# Patient Record
Sex: MALE | Race: OTHER | HISPANIC OR LATINO | Employment: OTHER | URBAN - METROPOLITAN AREA
[De-identification: names, ages, dates, MRNs, and addresses within clinical notes are randomized per-mention and may not be internally consistent; named-entity substitution may affect disease eponyms.]

---

## 2018-03-24 ENCOUNTER — CLINICAL SUPPORT (OUTPATIENT)
Dept: OCCUPATIONAL MEDICINE | Facility: CLINIC | Age: 38
End: 2018-03-24

## 2018-03-24 DIAGNOSIS — Z00.00 PHYSICAL EXAM: Primary | ICD-10-CM

## 2018-03-24 PROCEDURE — 99499 UNLISTED E&M SERVICE: CPT | Mod: S$GLB,,, | Performed by: PREVENTIVE MEDICINE

## 2018-03-24 PROCEDURE — 99002 DEVICE HANDLING PHYS/QHP: CPT | Mod: S$GLB,,, | Performed by: PREVENTIVE MEDICINE

## 2018-03-24 PROCEDURE — 99080 SPECIAL REPORTS OR FORMS: CPT | Mod: S$GLB,,, | Performed by: PREVENTIVE MEDICINE

## 2018-03-24 PROCEDURE — 94010 BREATHING CAPACITY TEST: CPT | Mod: S$GLB,,, | Performed by: PREVENTIVE MEDICINE

## 2019-01-27 ENCOUNTER — HOSPITAL ENCOUNTER (EMERGENCY)
Facility: HOSPITAL | Age: 39
Discharge: HOME | End: 2019-01-27
Attending: EMERGENCY MEDICINE
Payer: MEDICAID

## 2019-01-27 VITALS
RESPIRATION RATE: 20 BRPM | DIASTOLIC BLOOD PRESSURE: 81 MMHG | SYSTOLIC BLOOD PRESSURE: 133 MMHG | HEART RATE: 61 BPM | WEIGHT: 193 LBS | HEIGHT: 69 IN | BODY MASS INDEX: 28.58 KG/M2 | OXYGEN SATURATION: 99 % | TEMPERATURE: 98 F

## 2019-01-27 DIAGNOSIS — R21 GENERALIZED RASH: Primary | ICD-10-CM

## 2019-01-27 PROCEDURE — 99284 EMERGENCY DEPT VISIT MOD MDM: CPT

## 2019-01-27 PROCEDURE — 25000003 PHARM REV CODE 250: Performed by: PHYSICIAN ASSISTANT

## 2019-01-27 PROCEDURE — 63600175 PHARM REV CODE 636 W HCPCS: Performed by: PHYSICIAN ASSISTANT

## 2019-01-27 RX ORDER — HYDROXYZINE HYDROCHLORIDE 25 MG/1
25 TABLET, FILM COATED ORAL EVERY 6 HOURS
Qty: 20 TABLET | Refills: 0 | Status: SHIPPED | OUTPATIENT
Start: 2019-01-27

## 2019-01-27 RX ORDER — PREDNISONE 20 MG/1
40 TABLET ORAL DAILY
Qty: 10 TABLET | Refills: 0 | Status: SHIPPED | OUTPATIENT
Start: 2019-01-27 | End: 2019-02-01

## 2019-01-27 RX ORDER — PREDNISONE 20 MG/1
60 TABLET ORAL
Status: COMPLETED | OUTPATIENT
Start: 2019-01-27 | End: 2019-01-27

## 2019-01-27 RX ORDER — DIPHENHYDRAMINE HCL 25 MG
25 TABLET ORAL NIGHTLY PRN
COMMUNITY

## 2019-01-27 RX ORDER — DIPHENHYDRAMINE HCL 50 MG
50 CAPSULE ORAL
Status: COMPLETED | OUTPATIENT
Start: 2019-01-27 | End: 2019-01-27

## 2019-01-27 RX ORDER — TRIAMCINOLONE ACETONIDE 1 MG/ML
LOTION TOPICAL 2 TIMES DAILY
Qty: 60 ML | Refills: 0 | Status: SHIPPED | OUTPATIENT
Start: 2019-01-27

## 2019-01-27 RX ORDER — FAMOTIDINE 20 MG/1
20 TABLET, FILM COATED ORAL
Status: COMPLETED | OUTPATIENT
Start: 2019-01-27 | End: 2019-01-27

## 2019-01-27 RX ORDER — HYDROXYZINE PAMOATE 25 MG/1
25 CAPSULE ORAL 4 TIMES DAILY
COMMUNITY
End: 2019-01-27 | Stop reason: ALTCHOICE

## 2019-01-27 RX ORDER — PREDNISONE 5 MG/1
5 TABLET ORAL DAILY
COMMUNITY
End: 2019-01-27

## 2019-01-27 RX ADMIN — PREDNISONE 60 MG: 20 TABLET ORAL at 11:01

## 2019-01-27 RX ADMIN — FAMOTIDINE 20 MG: 20 TABLET ORAL at 11:01

## 2019-01-27 RX ADMIN — DIPHENHYDRAMINE HYDROCHLORIDE 50 MG: 50 CAPSULE ORAL at 11:01

## 2019-01-27 NOTE — ED TRIAGE NOTES
Pt. With girlfriend who states that pt. Started having a rash on his chest since 1/3/19. She reports that pt. Was seen by his PCP and dx with with Scabies and was treated. Pt. Used medication from his neck down. On the 1/5/19 pt. Started having a rash to his face. Pt. Has been taking benadryl, vistaril, and prednisone with no relief. Pt. Reports itching to his face, back, chest and bilat upper thighs. Girlfriend reports she spoke with PCP and was told that pt. Possible did not have scabies and just to cont taking medication. She states her nor her daughter has had any rash or itching.

## 2019-01-27 NOTE — ED NOTES
Pt. Left with girlfriend- pt. Reports she will be following pt home. Pt. Is able to verbally communicate and has a steady gait. Pt. States she feels fine. Girlfriend will follow pt. Home.

## 2022-10-28 ENCOUNTER — HOSPITAL ENCOUNTER (EMERGENCY)
Facility: HOSPITAL | Age: 42
Discharge: HOME | End: 2022-10-28
Attending: EMERGENCY MEDICINE
Payer: MEDICAID

## 2022-10-28 VITALS
WEIGHT: 190 LBS | RESPIRATION RATE: 17 BRPM | DIASTOLIC BLOOD PRESSURE: 88 MMHG | HEIGHT: 69 IN | SYSTOLIC BLOOD PRESSURE: 131 MMHG | OXYGEN SATURATION: 100 % | TEMPERATURE: 98 F | BODY MASS INDEX: 28.14 KG/M2 | HEART RATE: 78 BPM

## 2022-10-28 DIAGNOSIS — S93.401A SPRAIN OF RIGHT ANKLE, UNSPECIFIED LIGAMENT, INITIAL ENCOUNTER: ICD-10-CM

## 2022-10-28 DIAGNOSIS — M25.471 PAIN AND SWELLING OF RIGHT ANKLE: Primary | ICD-10-CM

## 2022-10-28 DIAGNOSIS — M25.571 PAIN AND SWELLING OF RIGHT ANKLE: Primary | ICD-10-CM

## 2022-10-28 PROCEDURE — 99284 EMERGENCY DEPT VISIT MOD MDM: CPT

## 2022-10-28 NOTE — DISCHARGE INSTRUCTIONS
§ Please return to the Emergency Department for any new or worsening symptoms including: fever, chest pain, shortness of breath, loss of consciousness, dizziness, weakness, or any other concerns.     § Schedule an appointment for follow up with Orthopedics as soon as possible for a recheck of your symptoms. If you do not have one, contact the one listed on your discharge paperwork or call the [facility] Appointment Desk at [phone] to schedule an appointment.     § If you require follow up care from a specialist and are unable to schedule an appointment with them directly, please contact your Primary Care Provider on the next business day to set up a referral.      § Crutches: You can use crutches to help with walking for the next 2 - 3 days.  Rest, Use Ice Pack, Compress (use the ACE Wrap), and Elevate to help with pain and swelling.     Call 1: Dr. Prasad   Call 2: Dr. Sims  Call 3: [place] (or another primary care) to [facility] and [facility].       § Regrese al Departamento de Emergencias si presenta síntomas nuevos o que empeoran, incluidos: fiebre, dolor de pecho, dificultad para respirar, pérdida del conocimiento, mareos, debilidad o cualquier otra inquietud.    § Programe marybeth herminio de seguimiento con [employer] lo antes posible para un nuevo control de yenni síntomas. Si no tiene katia, comuníquese con el que figura en srivastava documentación de yas o llame al mostrador de citas de la [facility] al [phone] para programar marybeth herminio.    § Si necesita atención de seguimiento de un especialista y no puede programar marybeth herminio con él directamente, comuníquese con srivastava proveedor de atención primaria el siguiente día hábil para programar marybeth derivación.    § Muletas: puede usar muletas para ayudarlo a caminar mary alice los próximos 2 a 3 días. Descanse, use bolsa de hielo, comprima (use la envoltura ACE) y eleve para ayudar con el dolor y la hinchazón.    Llamada 1:   Osmar Leahy 2: Dra. Levi pineda 3: [facility] (u otro centro de atención primaria) para establecer atención y [facility] del [facility].

## 2022-10-28 NOTE — ED PROVIDER NOTES
Encounter Date: 10/28/2022    SCRIBE #1 NOTE: I, Anthony Cohen, am scribing for, and in the presence of,  LOLA Nuno. Other sections scribed: HPI, ROS.     History     Chief Complaint   Patient presents with    Ankle Injury     Presents with a complaint of right ankle pain and swelling, states twisted ankle yesterday after stepping on a pipe while at work, seen at Urgent Care and provided Rx for anti-inflammatories and crutches, Patient presents with image CD provided by staff.     CC: Ankle injury    HPI: This is a 41 y.o. M who has no PMHx who presents to the ED for emergent evaluation of acute right ankle pain that began after stepping on a pipe and twisting his ankle at work yesterday. Pt was evaluated and treated at an Urgent Care in [facility] yesterday. He was prescribed an anti-inflammatory and told to  crutches at the pharmacy as well. He was not montserrat to fill medications or  the crutches due to the pharmacy being closed by the time he was discharged from the Urgent care facility. The pt also had a X-ray of the ankle that was normal at that visit. He was instructed to the report to the ED if ankle pain worsens. Pt has no other complaints.    There is no problem list on file for this patient.    The history is provided by the patient. The history is limited by a language barrier. A  was used ([employer]  940695 used for interpretation.).   Review of patient's allergies indicates:  No Known Allergies  History reviewed. No pertinent past medical history.  History reviewed. No pertinent surgical history.  History reviewed. No pertinent family history.  Social History     Tobacco Use    Smoking status: Every Day     Packs/day: 0.25     Types: Cigarettes    Smokeless tobacco: Never   Substance Use Topics    Alcohol use: No    Drug use: No     Review of Systems   Constitutional:  Negative for fever.   HENT:  Negative for sore throat.    Respiratory:  Negative for  cough and shortness of breath.    Cardiovascular:  Negative for chest pain.   Gastrointestinal:  Negative for abdominal pain, diarrhea, nausea and vomiting.   Genitourinary:  Negative for dysuria.   Musculoskeletal:  Positive for arthralgias and joint swelling. Negative for back pain.   Skin:  Negative for rash.   Neurological:  Negative for weakness.   Hematological:  Does not bruise/bleed easily.   Psychiatric/Behavioral:  Negative for confusion.      Physical Exam     Initial Vitals [10/28/22 0657]   BP Pulse Resp Temp SpO2   134/86 77 17 97.9 °F (36.6 °C) 100 %      MAP       --         Physical Exam    Nursing note and vitals reviewed.  Constitutional: He appears well-developed and well-nourished. He is not diaphoretic. He is cooperative.  Non-toxic appearance. He does not have a sickly appearance. He does not appear ill. No distress.   HENT:   Head: Normocephalic and atraumatic.   Right Ear: External ear normal.   Left Ear: External ear normal.   Nose: Nose normal.   Mouth/Throat: Mucous membranes are normal. No trismus in the jaw.   Neck: Phonation normal.   Normal range of motion.  Cardiovascular:  Normal rate, regular rhythm and intact distal pulses.           Pulses:       Dorsalis pedis pulses are 2+ on the right side.   Pulmonary/Chest: No respiratory distress.   Abdominal: He exhibits no distension.   Musculoskeletal:      Cervical back: Normal range of motion.      Right hip: No tenderness or bony tenderness. Normal range of motion.      Right upper leg: No tenderness or bony tenderness.      Right knee: No bony tenderness. No tenderness.      Right lower leg: No tenderness or bony tenderness.      Right ankle: Swelling present. Tenderness present over the lateral malleolus. No proximal fibula tenderness.      Right foot: Tenderness (proximal/lateral) present.     Neurological: He is alert and oriented to person, place, and time. No sensory deficit. Coordination normal. GCS eye subscore is 4. GCS  verbal subscore is 5. GCS motor subscore is 6.   Skin: Skin is warm, dry and intact. Capillary refill takes less than 2 seconds. No bruising, no laceration and no rash noted. No cyanosis or erythema.   Psychiatric: He has a normal mood and affect. His speech is normal and behavior is normal. Judgment and thought content normal.       ED Course   Procedures  Labs Reviewed - No data to display       Imaging Results              X-Ray Ankle Complete Right (Final result)  Result time 10/28/22 08:02:26      Final result by Zelalem Zelaya MD (10/28/22 08:02:26)                   Impression:      Negative for fracture.  Some soft tissue swelling about the ankle, particularly at the lateral malleolus.      Electronically signed by: Zelalem Zelaya MD  Date:    10/28/2022  Time:    08:02               Narrative:    EXAMINATION:  XR ANKLE COMPLETE 3 VIEW RIGHT    CLINICAL HISTORY:  Pain in right ankle and joints of right foot    TECHNIQUE:  AP, lateral, and oblique images of the right ankle were performed.    COMPARISON:  None    FINDINGS:  Bones are well mineralized.  The ankle mortise is intact.  No fracture or dislocation is seen.  Mild degenerative changes at the talonavicular joint.  Soft tissue swelling about the ankle, particularly about the lateral malleolus.                                       X-Ray Foot Complete Right (Final result)  Result time 10/28/22 08:06:21      Final result by Zelalem Zelaya MD (10/28/22 08:06:21)                   Impression:      No acute abnormality      Electronically signed by: Zelalem Zelaya MD  Date:    10/28/2022  Time:    08:06               Narrative:    EXAMINATION:  XR FOOT COMPLETE 3 VIEW RIGHT    CLINICAL HISTORY:  . Pain in right ankle and joints of right foot    TECHNIQUE:  AP, lateral, and oblique views of the right foot were performed.    COMPARISON:  None    FINDINGS:  Bones are well mineralized.  Alignment is satisfactory and joint spaces appear adequately maintained.  Slight  degenerative changes at the 1st MTP joint.  Mild degenerative changes at the talonavicular joint.  No definite evidence of acute fracture or dislocation.  A small, well corticated ossific density is seen adjacent to the distal tip of the fibula.  Exostosis at the base of the distal phalanx of the great toe.  No soft tissue abnormality appreciated.                                       Medications - No data to display        APC / Resident Notes:   This is an evaluation of a 41 y.o. male that presents to the Emergency Department for right ankle pain following injury. Seen at [facility] and diagnosed with sprain and xrays done. Unable to review CD images. Attempted to contact [facility] to discuss reason for sending patient to ED to ensure patient receives appropriate care. Awaiting callback as of 7:15a. Physical Exam shows a non-toxic, afebrile, and well appearing male.  Tenderness to palpation of the right lateral malleolus.  Some tenderness over the very proximal right lateral foot.  Compartments soft.  Vital signs are reassuring. If available, previous records reviewed. RESULTS:  X-ray of the foot and ankle without acute displaced fracture or dislocation.    My overall impression is right ankle pain and swelling, suspect sprain. I considered, but at this time, do not suspect acute displaced fracture, dislocation, septic joint, cellulitis, compartment syndrome.    Discharge Meds/Instructions:  Continue NSAIDs as prescribed by outside clinic, will give crutches as patient does not currently have crutches.  Ace wrap. The diagnosis, treatment plan, instructions for follow-up as well as ED return precautions were discussed. All questions have been answered. MEET Frederick, JAMIEP-C   Scribe Attestation:   Scribe #1: I performed the above scribed service and the documentation accurately describes the services I performed. I attest to the accuracy of the note.      ED Course as of 10/28/22 0903   Fri Oct 28, 2022   0715 Call out to [facility]  to speak with Dr. Mary Lala regarding why the patient was sent to the ED. Left message with the [facility] staff to have Dr. Lala return the call. Patient family states was told to come to the ED this morning to be seen by orthopedics. Family reports xray done and told there was not fracture but it was sprained. As I am unable to review images on the patients CD, will order xrays.  [AF]   0902 At the time of note completion, I still have not had a call back from Dr. Lala. With xrays being negative and no additional communication or paperwork from [facility] - will discharge the patient with PCP/Ortho f/u. RICE instructions.  [AF]      ED Course User Index  [AF] Toan aHll, LOLA KOLB, MEET Frederick, FNP-C, personally performed the services described in this documentation. All medical record entries made by the scribe were at my direction and in my presence. I have reviewed the chart and agree that the record reflects my personal performance and is accurate and complete.    Clinical Impression:   Final diagnoses:  [M25.571, M25.471] Pain and swelling of right ankle (Primary)  [S93.401A] Sprain of right ankle, unspecified ligament, initial encounter      ED Disposition Condition    Discharge Stable          ED Prescriptions    None       Follow-up Information       Follow up With Specialties Details Why Contact Info    Stephane Prasad MD Orthopedic Surgery Call in 1 day To discuss your ED visit & schedule follow-up [address]  [address]  [place] [address]  [phone]      Jose Ramon Sims MD Orthopedic Surgery Call in 1 day to schedule an appointment for followup with Orthopedics [address]  [address]  [place] [address]  [phone]      [facility] [place] [facility] [place]  Schedule an appointment as soon as possible for a visit  For Follow-Up, This Week, If you do not have a Primary Care Doctor [address] [place] [address]  [phone]      [facility] Surgical Oncology,  Orthopedic Surgery, Genetics, Physical Medicine and Rehabilitation, Occupational Therapy, Radiology Schedule an appointment as soon as possible for a visit   [address] [address]  [phone]               LOLA gN  10/28/22 0903

## 2024-05-07 ENCOUNTER — HOSPITAL ENCOUNTER (EMERGENCY)
Facility: HOSPITAL | Age: 44
Discharge: HOME | End: 2024-05-08
Attending: EMERGENCY MEDICINE
Payer: COMMERCIAL

## 2024-05-07 DIAGNOSIS — H20.9 IRITIS: ICD-10-CM

## 2024-05-07 DIAGNOSIS — H18.891 CORNEAL RUST RING OF RIGHT EYE: Primary | ICD-10-CM

## 2024-05-07 DIAGNOSIS — S05.01XA ABRASION OF RIGHT CORNEA, INITIAL ENCOUNTER: ICD-10-CM

## 2024-05-07 PROCEDURE — 65222 REMOVE FOREIGN BODY FROM EYE: CPT | Mod: RT

## 2024-05-07 PROCEDURE — 25000003 PHARM REV CODE 250: Performed by: NURSE PRACTITIONER

## 2024-05-07 PROCEDURE — 25000003 PHARM REV CODE 250: Performed by: PHYSICIAN ASSISTANT

## 2024-05-07 PROCEDURE — 99284 EMERGENCY DEPT VISIT MOD MDM: CPT | Mod: 25

## 2024-05-07 RX ORDER — OXYCODONE AND ACETAMINOPHEN 5; 325 MG/1; MG/1
1 TABLET ORAL
Status: COMPLETED | OUTPATIENT
Start: 2024-05-07 | End: 2024-05-08

## 2024-05-07 RX ORDER — PROPARACAINE HYDROCHLORIDE 5 MG/ML
1 SOLUTION/ DROPS OPHTHALMIC
Status: COMPLETED | OUTPATIENT
Start: 2024-05-07 | End: 2024-05-07

## 2024-05-07 RX ORDER — CYCLOPENTOLATE HYDROCHLORIDE 10 MG/ML
2 SOLUTION/ DROPS OPHTHALMIC EVERY 6 HOURS PRN
Qty: 15 ML | Refills: 0 | Status: SHIPPED | OUTPATIENT
Start: 2024-05-07

## 2024-05-07 RX ORDER — CYCLOPENTOLATE HYDROCHLORIDE 10 MG/ML
1 SOLUTION/ DROPS OPHTHALMIC
Status: COMPLETED | OUTPATIENT
Start: 2024-05-07 | End: 2024-05-08

## 2024-05-07 RX ORDER — ERYTHROMYCIN 5 MG/G
OINTMENT OPHTHALMIC 4 TIMES DAILY
Qty: 3.5 G | Refills: 0 | Status: SHIPPED | OUTPATIENT
Start: 2024-05-07 | End: 2024-05-14

## 2024-05-07 RX ADMIN — FLUORESCEIN SODIUM 1 EACH: 1 STRIP OPHTHALMIC at 11:05

## 2024-05-07 RX ADMIN — PROPARACAINE HYDROCHLORIDE 1 DROP: 5 SOLUTION/ DROPS OPHTHALMIC at 11:05

## 2024-05-08 ENCOUNTER — OFFICE VISIT (OUTPATIENT)
Dept: OPHTHALMOLOGY | Facility: CLINIC | Age: 44
End: 2024-05-08
Payer: COMMERCIAL

## 2024-05-08 VITALS
WEIGHT: 185 LBS | OXYGEN SATURATION: 99 % | DIASTOLIC BLOOD PRESSURE: 93 MMHG | TEMPERATURE: 98 F | SYSTOLIC BLOOD PRESSURE: 147 MMHG | RESPIRATION RATE: 18 BRPM | BODY MASS INDEX: 27.4 KG/M2 | HEIGHT: 69 IN | HEART RATE: 60 BPM

## 2024-05-08 VITALS
TEMPERATURE: 98 F | WEIGHT: 185 LBS | DIASTOLIC BLOOD PRESSURE: 82 MMHG | HEART RATE: 63 BPM | RESPIRATION RATE: 16 BRPM | BODY MASS INDEX: 27.4 KG/M2 | OXYGEN SATURATION: 99 % | HEIGHT: 69 IN | SYSTOLIC BLOOD PRESSURE: 123 MMHG

## 2024-05-08 DIAGNOSIS — H16.001 CORNEAL ULCER OF RIGHT EYE: Primary | ICD-10-CM

## 2024-05-08 DIAGNOSIS — T15.01XA RUST RING OF RIGHT CORNEA DUE TO METALLIC FOREIGN BODY: ICD-10-CM

## 2024-05-08 DIAGNOSIS — H57.8A9 SENSATION OF FOREIGN BODY IN EYE: Primary | ICD-10-CM

## 2024-05-08 LAB
GRAM STN SPEC: NORMAL
GRAM STN SPEC: NORMAL
KOH PREP SPEC: NORMAL

## 2024-05-08 PROCEDURE — 87210 SMEAR WET MOUNT SALINE/INK: CPT

## 2024-05-08 PROCEDURE — 99282 EMERGENCY DEPT VISIT SF MDM: CPT

## 2024-05-08 PROCEDURE — 92002 INTRM OPH EXAM NEW PATIENT: CPT | Mod: S$GLB,,, | Performed by: STUDENT IN AN ORGANIZED HEALTH CARE EDUCATION/TRAINING PROGRAM

## 2024-05-08 PROCEDURE — 87070 CULTURE OTHR SPECIMN AEROBIC: CPT

## 2024-05-08 PROCEDURE — 87102 FUNGUS ISOLATION CULTURE: CPT

## 2024-05-08 PROCEDURE — 25000003 PHARM REV CODE 250

## 2024-05-08 PROCEDURE — 87205 SMEAR GRAM STAIN: CPT

## 2024-05-08 PROCEDURE — 87075 CULTR BACTERIA EXCEPT BLOOD: CPT

## 2024-05-08 PROCEDURE — 99999 PR PBB SHADOW E&M-EST. PATIENT-LVL III: CPT | Mod: PBBFAC,,, | Performed by: STUDENT IN AN ORGANIZED HEALTH CARE EDUCATION/TRAINING PROGRAM

## 2024-05-08 RX ADMIN — OXYCODONE HYDROCHLORIDE AND ACETAMINOPHEN 1 TABLET: 5; 325 TABLET ORAL at 12:05

## 2024-05-08 RX ADMIN — CYCLOPENTOLATE HYDROCHLORIDE 1 DROP: 10 SOLUTION/ DROPS OPHTHALMIC at 12:05

## 2024-05-08 NOTE — ED TRIAGE NOTES
Patient reports metal in eye yesterday. Evaluated per urgent care and [facility] and was unable to see foreign object. Instructed to come to ER for optho eval today.

## 2024-05-08 NOTE — PROGRESS NOTES
Subjective:       Patient ID: Harman Osuna is a 43 y.o. male.    Chief Complaint: Follow-up    HPI    43 y.o. male presents for ED Follow up. Was seen at ED today for foreign   body OD. Patient is a , was working while wearing eye protection and   states something flew into his right eye. States ophthalmologist said he   did not have anything in his eye anymore but it left behind a rust ring   but did not feel comfortable removing it due to damage it may cause.   Patient was unable to start eye drops due to them being out of stock.   Complains of light sensitivity and blurred vision OD, pain, and tearing.   Denies discharge, headaches, flashes, and floaters.   Last edited by Sasha Colin on 5/8/2024  1:44 PM.               Assessment & Plan   Corneal ulcer of right eye  -     CULTURE, AEROBIC  (SPECIFY SOURCE)  -     CULTURE, ANAEROBE  -     Gram stain  -     Fungus culture  -     KOH PREP         PLAN  Cultures taken at slit lamp  Moxi 1 gtt x3 q5 minute for loading (given in clinic)  Then Moxi q1H around the clock  Work not provided       RTC 1 day with cornea clinic    Rola Caceres M.D., M.S.  Department of Ophthalmology   Division of Glaucoma Surgery  [facility]

## 2024-05-08 NOTE — DISCHARGE INSTRUCTIONS
Go directly to your appointment with the eye doctor at 1pm today.  10th floor [facility]  Return to ED for any concerns

## 2024-05-08 NOTE — ED PROVIDER NOTES
Encounter Date: 5/7/2024    SCRIBE #1 NOTE: I, Reebca Tasia, am scribing for, and in the presence of,  José Luis Hankins PA-C.       History     Chief Complaint   Patient presents with    Foreign Body in Eye     Reports metal object in eye today, reports going to urgent care and being told to go to ER due to objecting in cornea     44 yo Lao speaking M w/ no PMHx presenting to the ED for pain and FB dislodged in R eye. 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 He had protective glasses on. He went to  and they were unable to remove the FB dislodged in his R eye and was told to come here for evaluation. No other exacerbating or alleviating factors. Denies vision changes, or other associated symptoms.     The history is provided by the patient.     Review of patient's allergies indicates:  No Known Allergies  History reviewed. No pertinent past medical history.  History reviewed. No pertinent surgical history.  No family history on file.  Social History     Tobacco Use    Smoking status: Every Day     Current packs/day: 0.25     Types: Cigarettes    Smokeless tobacco: Never   Substance Use Topics    Alcohol use: No    Drug use: No     Review of Systems   Constitutional:  Negative for chills, diaphoresis, fatigue and fever.   HENT:  Negative for congestion, ear discharge, ear pain, rhinorrhea, sore throat and trouble swallowing.    Eyes:  Positive for pain. Negative for photophobia, redness and visual disturbance.        +FB in R eye    Respiratory:  Negative for cough and shortness of breath.    Cardiovascular:  Negative for chest pain, palpitations and leg swelling.   Gastrointestinal:  Negative for abdominal pain, diarrhea, nausea and vomiting.   Genitourinary:  Negative for difficulty urinating, dysuria, flank pain, frequency and hematuria.   Musculoskeletal:  Negative for arthralgias, back pain, myalgias, neck pain and neck stiffness.   Skin:  Negative for  pallor and rash.   Neurological:  Negative for dizziness, weakness, light-headedness, numbness and headaches.   Hematological:  Does not bruise/bleed easily.       Physical Exam     Initial Vitals [05/07/24 2016]   BP Pulse Resp Temp SpO2   (!) 144/90 70 18 97.8 °F (36.6 °C) 99 %      MAP       --         Physical Exam    Nursing note and vitals reviewed.  Constitutional: He appears well-developed and well-nourished. He is not diaphoretic. He does not appear ill. No distress.   HENT:   Head: Normocephalic and atraumatic.   Right Ear: External ear normal.   Left Ear: External ear normal.   Nose: Nose normal.   Mouth/Throat: Uvula is midline and oropharynx is clear and moist.   Eyes: EOM and lids are normal. Pupils are equal, round, and reactive to light. Right eye exhibits no chemosis, no discharge, no exudate and no hordeolum. No foreign body present in the right eye. Left eye exhibits no chemosis, no discharge, no exudate and no hordeolum. No foreign body present in the left eye. Right conjunctiva is injected. Right conjunctiva has no hemorrhage. Left conjunctiva is not injected. Left conjunctiva has no hemorrhage. No scleral icterus.   Slit lamp exam:       The right eye shows corneal abrasion and fluorescein uptake. The right eye shows no corneal flare, no corneal ulcer, no foreign body, no hyphema and no hypopyon.   No rita sign. Rust ring at 6 o'clock of cornea   Neck: Trachea normal. Neck supple.   Normal range of motion.   Full passive range of motion without pain.     Cardiovascular:  Normal rate, regular rhythm, normal heart sounds, intact distal pulses and normal pulses.     Exam reveals no distant heart sounds and no friction rub.       Pulmonary/Chest: Effort normal and breath sounds normal. No respiratory distress.   Abdominal: Abdomen is soft. Bowel sounds are normal. He exhibits no distension and no pulsatile midline mass. There is no abdominal tenderness.   No right CVA tenderness.  No left CVA  tenderness. There is no rebound and no guarding.   Musculoskeletal:         General: Normal range of motion.      Right shoulder: Normal.      Left shoulder: Normal.      Right elbow: Normal.      Left elbow: Normal.      Right wrist: Normal.      Left wrist: Normal.      Right hand: Normal.      Left hand: Normal.      Cervical back: Normal, full passive range of motion without pain, normal range of motion and neck supple.      Thoracic back: Normal.      Lumbar back: Normal.      Right hip: Normal.      Left hip: Normal.      Right knee: Normal.      Left knee: Normal.      Right lower leg: Normal.      Left lower leg: Normal.      Right ankle: Normal.      Left ankle: Normal.      Right foot: Normal.      Left foot: Normal.     Neurological: He is alert and oriented to person, place, and time. He has normal strength. No cranial nerve deficit or sensory deficit. Coordination and gait normal.   Skin: Skin is warm and dry. Capillary refill takes less than 2 seconds. No bruising, no ecchymosis and no rash noted. No erythema.   Psychiatric: He has a normal mood and affect. His speech is normal and behavior is normal. Thought content normal.         ED Course   Foreign Body    Date/Time: 5/7/2024 11:52 PM    Performed by: Francesco Watson PA-C  Authorized by: Blade Hernandez MD  Body area: eye  Location details: right cornea  Anesthesia: local infiltration    Anesthesia:  Local Anesthetic: proparacaine drops    Patient sedated: no  Patient restrained: no  Patient cooperative: yes  Localization method: slit lamp and visualized  Removal mechanism: moist cotton swab and ophthalmic le  Eye examined with fluorescein.  Fluorescein uptake.  Corneal abrasion size: medium  Corneal abrasion location: inferior  Residual rust ring present.  Residual rust ring removed: only partial removal.  Dressing: antibiotic drops  Depth: embedded  Complexity: complex  Patient tolerance: Patient tolerated the procedure well with no  immediate complications  Comments: No FB: just rust ring. Only partial removal of rust ring. Edge of cornea and sclera, 6 o'clock position.      Labs Reviewed - No data to display       Imaging Results    None          Medications   cyclopentolate 1% ophthalmic solution 1 drop (has no administration in time range)   oxyCODONE-acetaminophen 5-325 mg per tablet 1 tablet (has no administration in time range)   fluorescein ophthalmic strip 1 each (1 each Right Eye Given by Provider 5/7/24 1420)   proparacaine 0.5 % ophthalmic solution 1 drop (1 drop Right Eye Given by Other 5/7/24 8621)     Medical Decision Making  44 yo Wolof speaking M w/ no PMHx presenting to the ED for pain and FB dislodged in R eye. 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 He had protective glasses on. He went to  and they were unable to remove the FB dislodged in his R eye and was told to come here for evaluation. Patient received Tdap at  today.  Patient's chart and medical history reviewed.  Patient's vitals reviewed.  They are afebrile, no respiratory distress, nontoxic-appearing in the ED.  Differential includes conjunctivitis, iritis (traumatic vs non), subconjunctival hemorrhage, corneal abrasion, corneal ulcer, conjunctivitis (bacterial, viral, chemical), retinal detachment, vitreous hemorrhage, papilledema, periorbital/preseptal cellulitis, orbital cellulitis, lid laceration, open globe, Hordeolum.  Physical exam as noted above.   Partial rust ring removed. Patient has already received tetanus booster at  today. Patient given cycloplegics due to consensual photophobia concerning for uveitis/iritis. Patient will be dc with erythromycin ointment sent to  pharmacy. Pt given percocet in ED for pain control with improvement of pain. Patient will f/u with ophthalmology triage at [facility] tomorrow and call when they open at 8am. Referral for ophthalmology also made for urgent follow up.    At this time I'll discharge home to follow up with primary care physician in the next 1-2 days for further evaluation.  If the pain continues the pt will need to see Ophthalmology for further evaluation.  The patient is comfortable with this plan and comfortable going home at this time. After taking into careful account the historical factors and physical exam findings of the patient's presentation today, in conjunction with the empirical and objective data obtained on ED workup, no acute emergent medical condition has been identified. The patient appears to be low risk for an emergent medical condition and I feel it is safe and appropriate at this time for the patient to be discharged to follow-up as detailed in their discharge instructions for reevaluation and possible continued outpatient workup and management. I have discussed the specifics of the workup with the patient and the patient has verbalized understanding of the details of the workup, the diagnosis, the treatment plan, and the need for outpatient follow-up.  Although the patient has no emergent etiology today this does not preclude the development of an emergent condition so in addition, I have advised the patient that they can return to the ED and/or activate EMS at any time with worsening of their symptoms, change of their symptoms, or with any other medical complaint.  The patient remained comfortable and stable during their visit in the ED.  Discharge and follow-up instructions discussed with the patient who expressed understanding and willingness to comply with my recommendations. I discussed with the patient/family the diagnosis, treatment plan, indications for return to the emergency department, and for expected follow-up. Please follow up with your primary doctor in 1-2 days and return to the ED in any new, worsening, or continued symptoms. The patient/family verbalized an understanding. The patient/family is asked if there are any questions  or concerns. We discuss the case, until all issues are addressed to the patient/family's satisfaction. Patient/family understands and is agreeable to the plan.    JOSÉ LUIS VALDEZ PA-C.    DISCLAIMER: This note was prepared with [employer] voice recognition transcription software. Garbled syntax, mangled pronouns, and other bizarre constructions may be attributed to that software system.      Amount and/or Complexity of Data Reviewed  External Data Reviewed: notes.    Risk  Prescription drug management.            Scribe Attestation:   Scribe #1: I performed the above scribed service and the documentation accurately describes the services I performed. I attest to the accuracy of the note.        ED Course as of 05/08/24 0002   Wed May 08, 2024   0002   Right Eye  Right Visual Status: Uncorrected  Right Visual Test: 20/20  Left Eye  Left Visual Status: Uncorrected  Left Visual Test: 20/20  Both Eyes  Both Visual Status: Uncorrected  Both Visual Test : 20/20     [AF]      ED Course User Index  [AF] José Luis Valdez PA-C                       I, José Luis Valdez PA-C, personally performed the services described in this documentation. All medical record entries made by the scribe were at my direction and in my presence. I have reviewed the chart and agree that the record reflects my personal performance and is accurate and complete.      Clinical Impression:  Final diagnoses:  [H18.891] Corneal rust ring of right eye (Primary)  [H20.9] Iritis  [S05.01XA] Abrasion of right cornea, initial encounter          ED Disposition Condition    Discharge Stable          ED Prescriptions       Medication Sig Dispense Start Date End Date Auth. Provider    erythromycin (ROMYCIN) ophthalmic ointment Place into the right eye 4 (four) times daily. Place a 1/2 inch ribbon of ointment into the lower eyelid. Every 6 hours for 7 days for 7 days 3.5 g 5/7/2024 5/14/2024 José Luis Valdez PA-C    cyclopentolate 1% (CYCLOGYL) 1 % ophthalmic  solution Place 2 drops into the right eye every 6 (six) hours as needed. 15 mL 5/7/2024 -- José Luis Hankins PA-C          Follow-up Information       Follow up With Specialties Details Why Contact Info    [facility] -  Schedule an appointment as soon as possible for a visit in 1 day for follow up [address]  [place] [address]  [phone]      Your primary care physician  Schedule an appointment as soon as possible for a visit in 1 day for follow up     Opthalmology triage  Call in 1 day for follow up [phone]    [facility] OPHTHALMOLOGY Ophthalmology Call in 1 day for follow up [address] [facility] [address]  [phone]             José Luis Hankins PA-C  05/08/24 0001       José Luis Hankins PA-C  05/08/24 0002

## 2024-05-08 NOTE — ED PROVIDER NOTES
Encounter Date: 5/8/2024   number: [id number]     History     Chief Complaint   Patient presents with    Foreign Body in Eye     Patient reports metal in eye yesterday. Evaluated per urgent care and [facility] and was unable to see foreign object. Instructed to come to ER for optho eval today.      HPI  42 y/o Romanian speaking only M presents with complaint of eye pain. 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 Pt was seen in UC just after but they were unable to remove foreign body and pt was sent to the ED. Per note at ED partial removal of a rust ring completed and pt told to come to main campus to see ophthalmology. Pr presents to ED for ophthalmology evaluation. Pt states he has some improvement of light sensitivity. No other complaints.    Review of patient's allergies indicates:  No Known Allergies    PMH: None    No past surgical history on file.    No family history on file.    Social History     Tobacco Use    Smoking status: Every Day     Current packs/day: 0.25     Types: Cigarettes    Smokeless tobacco: Never   Substance Use Topics    Alcohol use: No    Drug use: No       Physical Exam     Initial Vitals [05/08/24 0935]   BP Pulse Resp Temp SpO2   123/82 63 16 98 °F (36.7 °C) 99 %      MAP       --         Physical Exam    Nursing note and vitals reviewed.  Constitutional: He appears well-developed and well-nourished.   HENT:   Head: Normocephalic and atraumatic.   Eyes: EOM are normal. Pupils are equal, round, and reactive to light.   OD small defect at 6pm      Neurological: GCS score is 15. GCS eye subscore is 4. GCS verbal subscore is 5. GCS motor subscore is 6.   Skin: Skin is warm and dry.         ED Course   Procedures    Labs Reviewed - No data to display       Imaging Results    None          Medications - No data to display    Medical Decision Making  42 y/o Romanian speaking only M presents with  complaint of metal foreign body in right eye- here for ophthalmology follow up, per note should have called ophtho triage. Given tetanus update ot UC yesterday. I called and pt now has an appt with Dr. Caceres at 1pm. Pt and significant other informed. Discharged from ED. Routine return precautions                                Clinical Impression:  Final diagnoses:  [H57.8A9] Sensation of foreign body in eye (Primary)  [T15.01XA] Rust ring of right cornea due to metallic foreign body          ED Disposition Condition    Discharge Stable          ED Prescriptions    None       Follow-up Information       Follow up With Specialties Details Why Contact Info Additional Information    [facility] - [address] [place] [address] Ophthalmology Today you have an appointment at 1pm with Dr. Caceres [facility] [address] [phone] Please arrive on the 10th floor for check-in.             Anamaria Bello MD  05/08/24 3338

## 2024-05-08 NOTE — LETTER
May 8, 2024    Harman Osuna  [id number] [address] [place] [address]             [facility] - [address] [place] [address]  Ophthalmology  [address]  [place] [address]  Phone: [phone]  Fax: [phone]   May 8, 2024     Patient: Harman Osuna   YOB: 1980   Date of Visit: 5/8/2024       To Whom it May Concern:    Harman Osuna was seen in my clinic on 5/8/2024. He should remain out of work until released by physician.    If you have any questions or concerns, please don't hesitate to call.    Sincerely,         Rola Caceres MD

## 2024-05-08 NOTE — ED TRIAGE NOTES
Harman Osuna is a 43 y.o male to ED c/o foreign body to right eye x1 day.  States that a piece of metal fell in eye at work.  Was seen at urgent care and told to come to ED.

## 2024-05-08 NOTE — DISCHARGE INSTRUCTIONS
I have reviewed discharge instructions with the patient. The patient verbalized understanding.  tHE PATIENT WAS TAKEN TO [facility]IA [facility]  REPORT WAS GIVEN TO JORDAN BROWN AT THE [facility]  PRESCRIPTIONS, RUSS, MAR, AND D/C PAPERWORK INCLUDED Thank you for coming to our Emergency Department today. It is important to remember that some problems or medical conditions are difficult to diagnose and may not be found or addressed during your Emergency Department visit.  These conditions often start with non-specific symptoms and can only be diagnosed on follow up visits with your primary care physician or specialist when the symptoms continue or change. Please remember that all medical conditions can change, and we cannot predict how you will be feeling tomorrow or the next day. Return to the ER with any questions/concerns, new/concerning symptoms, worsening or failure to improve. Also, please follow up with your Primary Care Physician and/or Pediatrician in the next 1-2 days to review your ED visit in entirety and for re-evaluation.   Be sure to follow up with your primary care doctor and review all labs/imaging/tests that were performed during your ER visit with them. It is very common for us to identify non-emergent incidental findings which must be followed up with your primary care physician.  Some labs/imaging/tests may be outside of the normal range, and require non-emergent follow-up and/or further investigation/treatment/procedures/testing to help diagnose/exclude/prevent complications or other potentially serious medical conditions. Some abnormalities may not have been discussed or addressed during your ER visit. Some lab results may not return during your ER visit but can be accessible by downloading the free [facility] darshan or by visiting [email or web address] . It is important for you to review all labs/imaging/tests which are outside of the normal range with your physician.  An ER visit does not replace a primary care visit, and many screening tests or follow-up tests cannot be ordered by an ER doctor or performed by the ER. Some tests may even require pre-approval.  If you do not have a primary care doctor, you may contact the one listed  on your discharge paperwork or you may also call the [facility] Appointment Desk at [phone] , or [facility] at  [phone] to schedule an appointment, or establish care with a primary care doctor or even a specialist and to obtain information about local resources. It is important to your health that you have a primary care doctor.  Please take all medications as directed. We have done our best to select a medication for you that will treat your condition however, all medications may potentially have side-effects and it is impossible to predict which medications may give you side-effects or what those side-effects (if any) those medications may give you.  If you feel that you are having a negative effect or side-effect of any medication you should stop taking those medications immediately and seek medical attention. If you feel that you are having a life-threatening reaction call 911.  Do not drive, swim, climb to height, take a bath, operate heavy machinery, drink alcohol or take potentially sedating medications, sign any legal documents or make any important decisions for 24 hours if you have received any pain medications, sedatives or mood altering drugs during your ER visit or within 24 hours of taking them if they have been prescribed to you.   You can find additional resources for Dentists, hearing aids, durable medical equipment, low cost pharmacies and other resources at [email or web address]://[email or web address]  Patient agrees with this plan. Discussed with her strict return precautions, they verbalized understanding. Patient is stable for discharge.

## 2024-05-09 ENCOUNTER — OFFICE VISIT (OUTPATIENT)
Dept: OPHTHALMOLOGY | Facility: CLINIC | Age: 44
End: 2024-05-09
Payer: COMMERCIAL

## 2024-05-09 DIAGNOSIS — H16.001 CORNEAL ULCER OF RIGHT EYE: Primary | ICD-10-CM

## 2024-05-09 PROCEDURE — 99999 PR PBB SHADOW E&M-EST. PATIENT-LVL III: CPT | Mod: PBBFAC,,, | Performed by: OPHTHALMOLOGY

## 2024-05-09 PROCEDURE — 99204 OFFICE O/P NEW MOD 45 MIN: CPT | Mod: S$GLB,,, | Performed by: OPHTHALMOLOGY

## 2024-05-09 RX ORDER — POLYMYXIN B SULFATE AND TRIMETHOPRIM 1; 10000 MG/ML; [USP'U]/ML
1 SOLUTION OPHTHALMIC
Qty: 10 ML | Refills: 3 | Status: SHIPPED | OUTPATIENT
Start: 2024-05-09

## 2024-05-09 NOTE — PROGRESS NOTES
HPI    Referred by Dr. Caceres, ED follow up     Hx   Corneal Ulcer OD s/p K FB    No hx of surgeries     Gtts:  Moxifloxacin q1hr OD    43 y.o. male presents for corneal evaluation. Was seen in Dr. Caceres'   clinic for ED follow up - foreign body OD. Patient reports improved VA OD   since starting antibiotic drop. Pain has improved.    Last edited by Sheryl Cohen MD on 5/10/2024  9:54 AM.            Assessment /Plan     For exam results, see Encounter Report.    Corneal ulcer of right eye    Other orders  -     polymyxin B sulf-trimethoprim (POLYTRIM) 10,000 unit- 1 mg/mL Drop; Place 1 drop into the right eye every 2 (two) hours.  Dispense: 10 mL; Refill: 3      Corneal Ulcer OD s/p K FB    Improving subjectively but still with deep corneal ulcer inf at 6 oclock.    Cont vigamox, add polytrim.    F/up 1 wk VA OD    Today's visit is associated with current and anticipated ongoing medical care related to this patient's single serious/complex condition (cornea). Follow up is to be continued indefinitely to monitor the condition.

## 2024-05-11 LAB — BACTERIA SPEC AEROBE CULT: NO GROWTH

## 2024-05-13 ENCOUNTER — OFFICE VISIT (OUTPATIENT)
Dept: OPHTHALMOLOGY | Facility: CLINIC | Age: 44
End: 2024-05-13
Payer: COMMERCIAL

## 2024-05-13 DIAGNOSIS — H16.001 CORNEAL ULCER OF RIGHT EYE: Primary | ICD-10-CM

## 2024-05-13 PROCEDURE — 99214 OFFICE O/P EST MOD 30 MIN: CPT | Mod: S$GLB,,, | Performed by: OPHTHALMOLOGY

## 2024-05-13 PROCEDURE — 99999 PR PBB SHADOW E&M-EST. PATIENT-LVL II: CPT | Mod: PBBFAC,,, | Performed by: OPHTHALMOLOGY

## 2024-05-15 LAB — BACTERIA SPEC ANAEROBE CULT: NORMAL

## 2024-06-12 LAB — FUNGUS SPEC CULT: NORMAL

## 2024-12-13 ENCOUNTER — APPOINTMENT (OUTPATIENT)
Dept: RADIOLOGY | Facility: HOSPITAL | Age: 44
End: 2024-12-13
Attending: EMERGENCY MEDICINE

## 2024-12-13 ENCOUNTER — OFFICE VISIT (OUTPATIENT)
Dept: OPHTHALMOLOGY | Facility: CLINIC | Age: 44
End: 2024-12-13

## 2024-12-13 ENCOUNTER — CLINICAL SUPPORT (OUTPATIENT)
Dept: OPHTHALMOLOGY | Facility: CLINIC | Age: 44
End: 2024-12-13

## 2024-12-13 ENCOUNTER — HOSPITAL ENCOUNTER (EMERGENCY)
Facility: HOSPITAL | Age: 44
Discharge: HOME | End: 2024-12-13
Attending: EMERGENCY MEDICINE
Payer: OTHER MISCELLANEOUS

## 2024-12-13 VITALS
BODY MASS INDEX: 27.32 KG/M2 | TEMPERATURE: 98 F | DIASTOLIC BLOOD PRESSURE: 63 MMHG | SYSTOLIC BLOOD PRESSURE: 128 MMHG | WEIGHT: 185 LBS | RESPIRATION RATE: 18 BRPM | OXYGEN SATURATION: 100 % | HEART RATE: 69 BPM

## 2024-12-13 DIAGNOSIS — H11.32 SUBCONJUNCTIVAL HEMORRHAGE OF LEFT EYE: ICD-10-CM

## 2024-12-13 DIAGNOSIS — S05.92XA: ICD-10-CM

## 2024-12-13 DIAGNOSIS — H35.62 RETINAL HEMORRHAGE, LEFT: Primary | ICD-10-CM

## 2024-12-13 DIAGNOSIS — H33.22 LEFT RETINAL DETACHMENT: Primary | ICD-10-CM

## 2024-12-13 DIAGNOSIS — S05.8X2A COMMOTIO RETINAE OF LEFT EYE, INITIAL ENCOUNTER: ICD-10-CM

## 2024-12-13 DIAGNOSIS — H57.04 TRAUMATIC MYDRIASIS: ICD-10-CM

## 2024-12-13 PROCEDURE — 90715 TDAP VACCINE 7 YRS/> IM: CPT | Performed by: EMERGENCY MEDICINE

## 2024-12-13 PROCEDURE — 70486 CT MAXILLOFACIAL W/O DYE: CPT | Mod: TC

## 2024-12-13 PROCEDURE — 25000003 PHARM REV CODE 250: Performed by: EMERGENCY MEDICINE

## 2024-12-13 PROCEDURE — 25000003 PHARM REV CODE 250

## 2024-12-13 PROCEDURE — 70450 CT HEAD/BRAIN W/O DYE: CPT | Mod: TC

## 2024-12-13 PROCEDURE — 99284 EMERGENCY DEPT VISIT MOD MDM: CPT | Mod: 25

## 2024-12-13 PROCEDURE — 63600175 PHARM REV CODE 636 W HCPCS: Performed by: EMERGENCY MEDICINE

## 2024-12-13 PROCEDURE — 90471 IMMUNIZATION ADMIN: CPT | Performed by: EMERGENCY MEDICINE

## 2024-12-13 RX ORDER — IBUPROFEN 400 MG/1
400 TABLET ORAL
Status: COMPLETED | OUTPATIENT
Start: 2024-12-13 | End: 2024-12-13

## 2024-12-13 RX ORDER — ACETAMINOPHEN 325 MG/1
650 TABLET ORAL
Status: COMPLETED | OUTPATIENT
Start: 2024-12-13 | End: 2024-12-13

## 2024-12-13 RX ADMIN — FLUORESCEIN SODIUM 1 EACH: 1 STRIP OPHTHALMIC at 12:12

## 2024-12-13 RX ADMIN — ACETAMINOPHEN 650 MG: 325 TABLET ORAL at 11:12

## 2024-12-13 RX ADMIN — CLOSTRIDIUM TETANI TOXOID ANTIGEN (FORMALDEHYDE INACTIVATED), CORYNEBACTERIUM DIPHTHERIAE TOXOID ANTIGEN (FORMALDEHYDE INACTIVATED), BORDETELLA PERTUSSIS TOXOID ANTIGEN (GLUTARALDEHYDE INACTIVATED), BORDETELLA PERTUSSIS FILAMENTOUS HEMAGGLUTININ ANTIGEN (FORMALDEHYDE INACTIVATED), BORDETELLA PERTUSSIS PERTACTIN ANTIGEN, AND BORDETELLA PERTUSSIS FIMBRIAE 2/3 ANTIGEN 0.5 ML: 5; 2; 2.5; 5; 3; 5 INJECTION, SUSPENSION INTRAMUSCULAR at 11:12

## 2024-12-13 RX ADMIN — IBUPROFEN 400 MG: 400 TABLET, FILM COATED ORAL at 11:12

## 2024-12-13 NOTE — DISCHARGE INSTRUCTIONS
Go directly to [facility] 10th floor, Ophthalmology.    Dr. Herring

## 2024-12-13 NOTE — ED PROVIDER NOTES
Encounter Date: 12/13/2024    SCRIBE #1 NOTE: IFern, am scribing for, and in the presence of,  Keith Casarez MD. I have scribed the following portions of the note - Other sections scribed: HPI, ROS, PE.       History     Chief Complaint   Patient presents with    Facial Injury     Pt was hit with a chain in the face this morning while at work. Left eye injury noted. Swelling, abrasions and laceration noted to left eye and bridge of nose. Injury noted to eyeball itself. Blurred vision noted.      HPI: 44 year old male, with no PMHx, presents to the ED for evaluation of left facial pain, symptoms onset 2 hours prior to arrival s/p injury to face. Reports associated left eye pain, left sided visual disturbance. States he was at work when a come along tool broke and struck his left orbital. No other alleviating or exacerbating factors. Denies LOC, neck pain, neck stiffness, or other associated symptoms. This is the extent of the patient's complaints in the ED.     The history is provided by the patient. No  was used.     Review of patient's allergies indicates:  No Known Allergies  History reviewed. No pertinent past medical history.  History reviewed. No pertinent surgical history.  No family history on file.  Social History     Tobacco Use    Smoking status: Every Day     Current packs/day: 0.25     Types: Cigarettes     Passive exposure: Never    Smokeless tobacco: Never   Substance Use Topics    Alcohol use: No    Drug use: No     Review of Systems   Constitutional: Negative.  Negative for fever.   HENT: Negative.  Negative for sore throat.         (+) left sided facial pain   Eyes:  Positive for pain (left) and visual disturbance (left).   Respiratory: Negative.  Negative for shortness of breath.    Cardiovascular: Negative.  Negative for chest pain.   Gastrointestinal: Negative.  Negative for nausea and vomiting.   Endocrine: Negative.    Genitourinary: Negative.  Negative for  dysuria.   Musculoskeletal: Negative.  Negative for myalgias, neck pain and neck stiffness.   Skin:  Negative for rash.   Allergic/Immunologic: Negative.    Neurological: Negative.  Negative for syncope and headaches.   Hematological: Negative.  Negative for adenopathy.   Psychiatric/Behavioral: Negative.  Negative for behavioral problems.    All other systems reviewed and are negative.      Physical Exam     Initial Vitals [12/13/24 1043]   BP Pulse Resp Temp SpO2   128/63 69 18 98.3 °F (36.8 °C) 100 %      MAP       --         Physical Exam    Nursing note and vitals reviewed.  Constitutional: He appears well-developed and well-nourished.   HENT:   Head: Normocephalic and atraumatic.   Eyes: EOM are normal. Pupils are equal, round, and reactive to light. Right eye exhibits no chemosis, no discharge, no exudate and no hordeolum. No foreign body present in the right eye. Left eye exhibits no chemosis, no discharge, no exudate and no hordeolum. No foreign body present in the left eye. Left conjunctiva has a hemorrhage. No scleral icterus.   Swelling on the left superior orbital rim.  Left Pupil dilated and unresponsive. No hyphema. Retina intact with normal contours. The right eye pupil is constricted. Believe it is a compensatory response to left-sided traumatic mydriasis.. EOM intact. No crepitus. Findings consistent with traumatic mydriasis. Patient's globe is intact on both the left and right.  There is a medial subconjunctival hemorrhage in the area the medial canthus.  IOP on the right this 16 IOP on the left which is the injured eye is 22.  There is no fluorescein uptake other than the 11 o'clock position on the injured eye.  Once again there is no evidence of a hyphema.  Did perform a bedside ultrasound and findings were consistent with retinal detachment around the optic nerve.   Neck: Neck supple. No thyroid mass and no thyromegaly present.   Normal range of motion.  Cardiovascular:  Normal rate, regular  rhythm, S1 normal, S2 normal, normal heart sounds and intact distal pulses.     Exam reveals no gallop and no friction rub.       No murmur heard.  Pulmonary/Chest: Effort normal and breath sounds normal. No respiratory distress.   Abdominal: Abdomen is soft. Bowel sounds are normal. There is no splenomegaly or hepatomegaly. There is no abdominal tenderness. No hernia.   There is no hepatosplenomegaly. There is no tenderness. There is no rigidity, no rebound, no guarding, no CVA tenderness, no tenderness at McBurney's point and negative Tejeda's sign. No hernia.      No right CVA tenderness.  No left CVA tenderness. There is no rebound, no guarding, no tenderness at McBurney's point and negative Tejeda's sign.   Musculoskeletal:         General: No edema. Normal range of motion.      Cervical back: Normal range of motion and neck supple.     Lymphadenopathy:     He has no axillary adenopathy.   Neurological: He is alert and oriented to person, place, and time. He has normal strength. He displays normal reflexes. No cranial nerve deficit or sensory deficit. GCS score is 15. GCS eye subscore is 4. GCS verbal subscore is 5. GCS motor subscore is 6.   Skin: Skin is warm, dry and intact. Capillary refill takes less than 2 seconds.   There is dried blood on the face, unable to ascertain where it came from.    Psychiatric: He has a normal mood and affect. His speech is normal. Judgment normal. Cognition and memory are normal.         ED Course   Procedures  Labs Reviewed - No data to display       Imaging Results               CT Head Without Contrast (Final result)  Result time 12/13/24 11:39:50      Final result by Rito Murry DO (12/13/24 11:39:50)                   Impression:      CT head: No acute intracranial findings specifically without evidence for acute intracranial hemorrhage or sulcal effacement to suggest large territory recent infarction    CT maxillofacial:    There is subtle question dependent  opacity or thickening along the left posterior wall globe which may represent small layering vitreous hemorrhage or retinal detachment clinical correlation and correlation with emergent ophthalmological evaluation recommended.    Soft tissue swelling induration overlies the left facial soft tissues without gross underlying fracture      Electronically signed by: Rito Murry DO  Date:    12/13/2024  Time:    11:39               Narrative:    EXAMINATION:  CT HEAD WITHOUT CONTRAST; CT MAXILLOFACIAL WITHOUT CONTRAST    CLINICAL HISTORY:  Head trauma, focal neuro findings (Age 19-64y);; Facial trauma, blunt;    TECHNIQUE:  CT head: Multiple sequential 5 mm axial images of the head without contrast.  Coronal and sagittal reformatted imaging from the axial acquisition    CT maxillofacial: 1.25 mm axial images of the maxillofacial bones without contrast.  Coronal sagittal reformatted imaging from the axial acquisition    COMPARISON:  CT head 06/17/2015    FINDINGS:  CT head: Allowing for distortion by motion artifacts no evidence for acute intracranial hemorrhage.  Ventricles stable in size without hydrocephalus.  No midline shift or mass effect.  Few trace ethmoid air cell opacities..    CT maxillofacial: Soft tissue swelling induration left periorbital perinasal and Swetha zygomatic soft tissues.  No evidence for underlying acute fracture.  Bony orbits are intact.  There is trace patchy ethmoid air cell opacities greater on the left.  There is subtle dependent material or thickening along the left posterior globe which may represent retinal hemorrhage clinical correlation and correlation with ophthalmological evaluation recommended.    This report was flagged in Epic as abnormal.                                        CT Maxillofacial Without Contrast (Final result)  Result time 12/13/24 11:39:50      Final result by Rito Murry DO (12/13/24 11:39:50)                   Impression:      CT head: No acute  intracranial findings specifically without evidence for acute intracranial hemorrhage or sulcal effacement to suggest large territory recent infarction    CT maxillofacial:    There is subtle question dependent opacity or thickening along the left posterior wall globe which may represent small layering vitreous hemorrhage or retinal detachment clinical correlation and correlation with emergent ophthalmological evaluation recommended.    Soft tissue swelling induration overlies the left facial soft tissues without gross underlying fracture      Electronically signed by: Rito Murry DO  Date:    12/13/2024  Time:    11:39               Narrative:    EXAMINATION:  CT HEAD WITHOUT CONTRAST; CT MAXILLOFACIAL WITHOUT CONTRAST    CLINICAL HISTORY:  Head trauma, focal neuro findings (Age 19-64y);; Facial trauma, blunt;    TECHNIQUE:  CT head: Multiple sequential 5 mm axial images of the head without contrast.  Coronal and sagittal reformatted imaging from the axial acquisition    CT maxillofacial: 1.25 mm axial images of the maxillofacial bones without contrast.  Coronal sagittal reformatted imaging from the axial acquisition    COMPARISON:  CT head 06/17/2015    FINDINGS:  CT head: Allowing for distortion by motion artifacts no evidence for acute intracranial hemorrhage.  Ventricles stable in size without hydrocephalus.  No midline shift or mass effect.  Few trace ethmoid air cell opacities..    CT maxillofacial: Soft tissue swelling induration left periorbital perinasal and Swetha zygomatic soft tissues.  No evidence for underlying acute fracture.  Bony orbits are intact.  There is trace patchy ethmoid air cell opacities greater on the left.  There is subtle dependent material or thickening along the left posterior globe which may represent retinal hemorrhage clinical correlation and correlation with ophthalmological evaluation recommended.    This report was flagged in Epic as abnormal.                                        Medications   Tdap vaccine injection 0.5 mL (0.5 mLs Intramuscular Given 12/13/24 1130)   acetaminophen tablet 650 mg (650 mg Oral Given 12/13/24 1145)   ibuprofen tablet 400 mg (400 mg Oral Given 12/13/24 1145)   fluorescein ophthalmic strip 1 each (1 each Left Eye Given 12/13/24 1215)     Medical Decision Making  Patient with facial trauma very concerned about the possibility of either vitreous hemorrhage or retinal detachment.  Ultrasound here in the emergency department concurred with CT findings of retinal detachment.  Spoke with Ophthalmology they will see the patient in her clinic.  The asked the patient go POV the patient has his  with him in the  will drive him.      Amount and/or Complexity of Data Reviewed  Radiology: ordered. Decision-making details documented in ED Course.    Risk  OTC drugs.  Prescription drug management.            Scribe Attestation:   Scribe #1: I performed the above scribed service and the documentation accurately describes the services I performed. I attest to the accuracy of the note.        ED Course as of 12/13/24 1249   Fri Dec 13, 2024   1246 The patient was put up for discharge at 12:46 p.m. and will go via POV to [facility] 10th floor to see Ophthalmology Dr. Herring. [MI]      ED Course User Index  [MI] Keith Casarez MD                         This document was produced by a scribe under my direction and in my presence. I agree with the content of the note and have made any necessary edits.     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

## 2024-12-13 NOTE — ED NOTES
"Covering non effected eye pt unable to see number of fingers nurse holing up. Pt stated " I only see shadows". Effected eye covered and pt able to properly identify number of fingers nurse holding up. Dr Whiteside present for visual acuity   "

## 2024-12-14 NOTE — PROGRESS NOTES
HPI    Patient here for ED f/u   Patient he was hit with a chain at work this morning. Pt reports swelling,   pain, very blurry VA.   Denies flashes of here light or photophobia. rfff  Last edited by Mckay Herring MD on 12/13/2024  2:51 PM.         A/P    ICD-10-CM ICD-9-CM   1. Retinal hemorrhage, left  H35.62 362.81   2. Ocular trauma, left eye, initial encounter  S05.92XA 921.9   3. Commotio retinae of left eye, initial encounter  S05.8X2A 921.3       1. Retinal hemorrhage, left  2. Ocular trauma, left eye, initial encounter  3. Commotio retinae of left eye, initial encounter  Urgent ED referral for eye trauma - Recent notes reviewed  Pt was struck in eye this AM by a chain    Exam notable for significant commotio and subretinal heme involving macula OS    Plan: discussed nature of findings at length with pt and fiancee. Given subretinal heme and severe trauma, guarded visual prognosis. Discussed option of pneumatic displacement of heme in clinic. After risks/benefits, pt wishes to proceed. May need PPV if worsening    Based on todays exam, diagnostic studies, and review of records, the determination was made for treatment today.  Schedule Pneumatic Injection today Left Eye Patient chooses to proceed R/B/A discussed patient elects to proceed    Procedure Note: Pneumatic Injection, Left eye    Preoperative Diagnosis: Subretinal hemorrhageLeft Eye  Anesthesia: topical  Prep: Topical properacaine then 10% Betadine to lids, lashes, conjunctiva  Subconjunctival Lidocaine 2%  A/C paracentesis performed with Tb syringe and 30g needle.  Then 100% C3F8 gas, 0.4 cc, injected 3.5-4.0 mm posterior to limbus inferotemporally.  IOP acceptable, central retinal artery patent, ONH perfused after procedure.  Post op diagnosis: same  Complications: none  Follow-up Monday AM, face down until then      RD precautions discussed in detail, patient expressed understanding  RTC immediately PRN (especially ANY change flashes, floaters,  vision, visual field)     RTC Monday DFE/OCTm OS          I saw and examined the patient and reviewed in detail the findings documented. The final examination findings, image interpretations which have been independently interpreted, and plan as documented in the record represent my personal judgment and conclusions.    Mckay Herring MD  Vitreoretinal Surgery   [facility]

## 2024-12-16 ENCOUNTER — CLINICAL SUPPORT (OUTPATIENT)
Dept: OPHTHALMOLOGY | Facility: CLINIC | Age: 44
End: 2024-12-16

## 2024-12-16 ENCOUNTER — OFFICE VISIT (OUTPATIENT)
Dept: OPHTHALMOLOGY | Facility: CLINIC | Age: 44
End: 2024-12-16
Payer: OTHER MISCELLANEOUS

## 2024-12-16 DIAGNOSIS — H35.62 RETINAL HEMORRHAGE, LEFT: Primary | ICD-10-CM

## 2024-12-16 DIAGNOSIS — S05.92XA: ICD-10-CM

## 2024-12-16 NOTE — LETTER
[facility] - [address] [place] [address]  [address]  [facility] [address]  Phone: [phone]  Fax: [phone] December 16, 2024     Patient: Harman Osuna   YOB: 1980   Date of Visit: 12/16/2024       To Whom It May Concern:    Harman Osuna is followed at our ophthalmology clinic for trauma to the left eye. Due to this eye condition, he needs to remain off work and perform no duties that involve heavy lifting, straining or bending until he is re-evaluated on 12/20/24.    If you have any questions or concerns, please don't hesitate to contact my office.    Sincerely,        Mckay Herring MD

## 2024-12-19 ENCOUNTER — OFFICE VISIT (OUTPATIENT)
Dept: OPHTHALMOLOGY | Facility: CLINIC | Age: 44
End: 2024-12-19
Payer: OTHER MISCELLANEOUS

## 2024-12-19 DIAGNOSIS — H35.62 RETINAL HEMORRHAGE, LEFT: Primary | ICD-10-CM

## 2024-12-19 DIAGNOSIS — S05.8X2D COMMOTIO RETINAE OF LEFT EYE, SUBSEQUENT ENCOUNTER: ICD-10-CM

## 2024-12-19 DIAGNOSIS — S05.92XD OCULAR TRAUMA, LEFT EYE, SUBSEQUENT ENCOUNTER: ICD-10-CM

## 2024-12-19 DIAGNOSIS — H43.12 VITREOUS HEMORRHAGE, LEFT: ICD-10-CM

## 2024-12-19 PROCEDURE — 99999 PR PBB SHADOW E&M-EST. PATIENT-LVL II: CPT | Mod: PBBFAC,,, | Performed by: OPHTHALMOLOGY

## 2024-12-19 RX ORDER — PREDNISOLONE ACETATE 10 MG/ML
1 SUSPENSION/ DROPS OPHTHALMIC 4 TIMES DAILY
Qty: 5 ML | Refills: 3 | Status: SHIPPED | OUTPATIENT
Start: 2024-12-19 | End: 2025-12-19

## 2024-12-19 RX ORDER — DORZOLAMIDE HYDROCHLORIDE AND TIMOLOL MALEATE 20; 5 MG/ML; MG/ML
1 SOLUTION/ DROPS OPHTHALMIC 2 TIMES DAILY
Qty: 10 ML | Refills: 3 | Status: SHIPPED | OUTPATIENT
Start: 2024-12-19 | End: 2025-12-19

## 2024-12-19 RX ORDER — BRIMONIDINE TARTRATE 2 MG/ML
1 SOLUTION/ DROPS OPHTHALMIC 3 TIMES DAILY
Qty: 5 ML | Refills: 3 | Status: SHIPPED | OUTPATIENT
Start: 2024-12-19 | End: 2025-12-19

## 2024-12-19 RX ORDER — BRIMONIDINE TARTRATE 2 MG/ML
1 SOLUTION/ DROPS OPHTHALMIC 3 TIMES DAILY
Qty: 5 ML | Refills: 3 | Status: SHIPPED | OUTPATIENT
Start: 2024-12-19 | End: 2024-12-19

## 2024-12-19 NOTE — PROGRESS NOTES
HPI    Pt here for OS injury // OCT // DFE OS   Last edited by Jordan Alva on 12/16/2024  9:15 AM.         A/P    ICD-10-CM ICD-9-CM   1. Retinal hemorrhage, left  H35.62 362.81   2. Ocular trauma, left eye, initial encounter  S05.92XA 921.9         1. Retinal hemorrhage, left  2. Ocular trauma, left eye, initial encounter  3. Commotio retinae of left eye, initial encounter  Pt was struck in eye prev AM by a chain     Initial exam notable for significant commotio and subretinal heme involving macula OS     S/p pneumatic displacement 12/13/24     Retina attached,  - IOP 30, released heme      Plan: discussed nature of findings at length with pt and olayinkae. Observe for now, face down still    Pathology of PVD, Retinal Tear, Retinal Detachment reviewed in great detail  RD precautions discussed in detail, patient expressed understanding  RTC immediately PRN (especially ANY change flashes, floaters, vision, visual field)         RTC Fri DFE/OCTm OS          I saw and examined the patient and reviewed in detail the findings documented. The final examination findings, image interpretations which have been independently interpreted, and plan as documented in the record represent my personal judgment and conclusions.    Mckay Herring MD  Vitreoretinal Surgery   [facility]

## 2024-12-19 NOTE — PROGRESS NOTES
HPI    DLS: 12/13/2024 Dr. Herring     Eye Meds: No gtts    44 y.o. male is here for severe eye pain, left eye. Pt states that after   Monday eye pain started and has become progressively worst. Pt states that   he has not slept in 3 days due to severe eye pain. No noticeable VA   changes since last visit. Left eye tears, itch, and burn. Photophobia,   left eye.   Last edited by Koby Barney OA on 12/19/2024 11:19 AM.         A/P    ICD-10-CM ICD-9-CM   1. Retinal hemorrhage, left  H35.62 362.81   2. Ocular trauma, left eye, subsequent encounter  S05.92XD V58.89     921.9   3. Commotio retinae of left eye, subsequent encounter  S05.8X2D V58.89     921.3   4. Vitreous hemorrhage, left  H43.12 379.23       1. Retinal hemorrhage, left  2. Ocular trauma, left eye, initial encounter  3. Commotio retinae of left eye, initial encounter  4. Vitreous hemorrhage, left    Pt was struck in eye prev AM by a chain    Initial exam notable for significant commotio and subretinal heme involving macula OS    S/p pneumatic displacement 12/13/24    Retina attached, pt with discomfort/pressure- IOP elevated, released heme improving      Plan: discussed nature of findings at length with pt and olayinkae. Start PF QID, cosopt BID, brim TID, recheck tomorrow    Pathology of PVD, Retinal Tear, Retinal Detachment reviewed in great detail  RD precautions discussed in detail, patient expressed understanding  RTC immediately PRN (especially ANY change flashes, floaters, vision, visual field)       RTC tomorrow DFE/OCTm OS      I saw and examined the patient and reviewed in detail the findings documented. The final examination findings, image interpretations which have been independently interpreted, and plan as documented in the record represent my personal judgment and conclusions.    Mckay Herring MD  Vitreoretinal Surgery   [facility]    Progress Summary Physical Therapy      Diagnosis:   Positional plagiocephaly (Q67.3)        Referring Provider: Zahraa Curtis  Date of Evaluation:    8/24/2023    Precautions:  None Next MD visit:   9 mo [facility]  Date of Surgery: n/a   Insurance Primary/Secondary: [facility] / N/A       # Auth Visits:      Total Timed Treatment: 45 min  Date POC Expires: n/a for ins, 11/24/23 for recheck   Total Treatment time: 45 min       Charges: 3 neuromusc       Treatment Number: 11    Subjective: Mom  brings Dakota to PT. She reports he is pulling up into hands and knees but still doesn't crawl forward. Pain: no signs of pain and not being treated for pain    Objective/Goals:   Goals:   Long Term Goals:   Mp Sidhu will demonstrate age appropriate gross motor skills with symmetric postural control     Short Term Goals: (to be met 12/5/23)    Caregivers will demonstrate HEP as instructed  Pull to sit with neck flexion x 3- met  Dakota will roll R and L prone <-> supine with symmetric head righting- limited rolling in treatment. Tends to roll on side to push up to sit, occasionally rolling supine to prone in clinic. Provided rocking and wt shift to increase head righting. Mp Sidhu will sit with neutral pelvis and varied leg position for 1 minute- met  Dakota will hold head and chest up with pushing up on hands and turning head equally to both sides- able to hold head up pushing up partially into all 4's flexing hips at 120 degrees. Stimulated pivoting which he struggles with as he attempts to reach for toy with hand closest to it instead of lateral wt shift to move. Mp Sidhu will crawl through tunnel- remains. PT assists him with lateral wt shift while providing input into hips and lower trunk while assisting with maintaining WB on opposite hand. Encouraged all 4's with hands at different heights as well as rocking. Then PT stimulated reaching across body in long sitting while bearing wt on same side hand which he resists.   Mp Sidhu will crawl forward on all 4's 5' remains       L post head flatness remains, parents awaiting possible helmet, as recently approved by insurance  Flexes neck with pull to sit    Supine- reaches up for toy. Prone-shifting wt left reaching with R hand, struggles to shift wt to R, pushes up nearly all 4's with limited hip felxion. Sitting with V sit, tucked 1 foot to transition partially to prone needing assist to transition fully to prone. Neutral head tilt   Supported standing bearing wt on flat feet at furniture, 10 seconds with guarding, needing min A to get down to sit. Continues to resist rotation and reaching across midline. HEP: rotation of trunk and WB on hands including carrying with trunk rotated (PT and mom worked on how to improve this)  Education: importance of trunk rotation    Assessment: Yoko Johny presents alert and wanting to play with toys, and pushing backwards on hands/partial all 4's. He whines but less fussy than last visit. He continues to struggle with rotation across midline and also when PT shifting him to the side in prone to reach with one hand and WB with the other. No clonus   WB on flat feet in supported standing. Plan: continue PT weekly. Consider EI  as another option   Patient/Family/Caregiver was advised of these findings, precautions, and treatment options and has agreed to actively participate in planning and for this course of care. Thank you for your referral. If you have any questions, please contact me at Dept: [phone]. Sincerely,  Electronically signed by therapist: Adam Sargent PT     Physician's certification required:  Yes  Please co-sign or sign and return this letter via fax as soon as possible to [phone]. I certify the need for these services furnished under this plan of treatment and while under my care.     X___________________________________________________ Date____________________    Certification From: 21/1/3187  To:1/3/2024

## 2024-12-20 ENCOUNTER — CLINICAL SUPPORT (OUTPATIENT)
Dept: OPHTHALMOLOGY | Facility: CLINIC | Age: 44
End: 2024-12-20

## 2024-12-20 ENCOUNTER — OFFICE VISIT (OUTPATIENT)
Dept: OPHTHALMOLOGY | Facility: CLINIC | Age: 44
End: 2024-12-20

## 2024-12-20 DIAGNOSIS — H35.62 RETINAL HEMORRHAGE, LEFT: Primary | ICD-10-CM

## 2024-12-20 PROCEDURE — 99213 OFFICE O/P EST LOW 20 MIN: CPT | Mod: PBBFAC | Performed by: OPHTHALMOLOGY

## 2024-12-20 PROCEDURE — 99999 PR PBB SHADOW E&M-EST. PATIENT-LVL III: CPT | Mod: PBBFAC,,, | Performed by: OPHTHALMOLOGY

## 2024-12-20 NOTE — LETTER
[facility] - [address] [place] [address]  [address]  [facility] [address]  Phone: [phone]  Fax: [phone] December 20, 2024     Patient: Harman Osuna   YOB: 1980   Date of Visit: 12/20/2024       To Whom It May Concern:    Harman Osuna is followed at our ophthalmology and was seen on 12/19/24 and 12/20/24 for follow up. Due to his eye condition, he should remain off of work until he is re-evaluated again on 12/23/24.     If you have any questions or concerns, please don't hesitate to contact my office.    Sincerely,        Mckay Herring MD
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
## 2024-12-23 ENCOUNTER — OFFICE VISIT (OUTPATIENT)
Dept: OPHTHALMOLOGY | Facility: CLINIC | Age: 44
End: 2024-12-23
Payer: OTHER MISCELLANEOUS

## 2024-12-23 DIAGNOSIS — H35.62 RETINAL HEMORRHAGE, LEFT: Primary | ICD-10-CM

## 2024-12-23 DIAGNOSIS — S05.92XD OCULAR TRAUMA, LEFT EYE, SUBSEQUENT ENCOUNTER: ICD-10-CM

## 2024-12-23 DIAGNOSIS — H40.41X0: ICD-10-CM

## 2024-12-23 DIAGNOSIS — H43.12 VITREOUS HEMORRHAGE, LEFT: ICD-10-CM

## 2024-12-23 DIAGNOSIS — S05.8X2D COMMOTIO RETINAE OF LEFT EYE, SUBSEQUENT ENCOUNTER: ICD-10-CM

## 2024-12-23 PROCEDURE — 99024 POSTOP FOLLOW-UP VISIT: CPT | Mod: S$GLB,,, | Performed by: STUDENT IN AN ORGANIZED HEALTH CARE EDUCATION/TRAINING PROGRAM

## 2024-12-23 PROCEDURE — 99999 PR PBB SHADOW E&M-EST. PATIENT-LVL III: CPT | Mod: PBBFAC,,, | Performed by: STUDENT IN AN ORGANIZED HEALTH CARE EDUCATION/TRAINING PROGRAM

## 2024-12-23 RX ORDER — ACETAZOLAMIDE 250 MG/1
500 TABLET ORAL
Status: COMPLETED | OUTPATIENT
Start: 2024-12-23 | End: 2024-12-23

## 2024-12-23 RX ADMIN — ACETAZOLAMIDE 500 MG: 250 TABLET ORAL at 11:12
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
## 2024-12-23 NOTE — PATIENT INSTRUCTIONS
EYE DROP INSTRUCTIONS  Please use your eyedrops as follows:  - The color refers to the bottle top.  - Some generic medications come in bottle with white tops. If you have any questions about your drops, please ask your doctor.    Drug Eye Top Color Breakfast Lunch Dinner Bedtime   Alphagan:  Brimonidine R Purple X  X X   Cosopt:  Dorzolamide/  Timolol R Blue X  X    Lelo 128 R White X X X X     Diamox  (acetazolamide) (pill)  500mg  500mg      Pred Forte:  prednisolone R Pink or   white X X X X

## 2024-12-24 ENCOUNTER — OFFICE VISIT (OUTPATIENT)
Dept: OPHTHALMOLOGY | Facility: CLINIC | Age: 44
End: 2024-12-24
Payer: OTHER MISCELLANEOUS

## 2024-12-24 DIAGNOSIS — H40.52X4 SECONDARY GLAUCOMA, LEFT, INDETERMINATE STAGE: Primary | ICD-10-CM

## 2024-12-24 PROCEDURE — 99999 PR PBB SHADOW E&M-EST. PATIENT-LVL III: CPT | Mod: PBBFAC,,, | Performed by: STUDENT IN AN ORGANIZED HEALTH CARE EDUCATION/TRAINING PROGRAM

## 2024-12-24 PROCEDURE — 99213 OFFICE O/P EST LOW 20 MIN: CPT | Mod: PBBFAC | Performed by: STUDENT IN AN ORGANIZED HEALTH CARE EDUCATION/TRAINING PROGRAM

## 2024-12-24 RX ORDER — ACETAZOLAMIDE 500 MG/1
500 CAPSULE, EXTENDED RELEASE ORAL 2 TIMES DAILY
Qty: 42 CAPSULE | Refills: 0 | Status: SHIPPED | OUTPATIENT
Start: 2024-12-24 | End: 2025-01-14
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
## 2024-12-31 ENCOUNTER — OFFICE VISIT (OUTPATIENT)
Dept: OPHTHALMOLOGY | Facility: CLINIC | Age: 44
End: 2024-12-31
Payer: OTHER MISCELLANEOUS

## 2024-12-31 DIAGNOSIS — H40.52X4 SECONDARY GLAUCOMA, LEFT, INDETERMINATE STAGE: Primary | ICD-10-CM

## 2024-12-31 PROCEDURE — 92133 CPTRZD OPH DX IMG PST SGM ON: CPT | Mod: S$GLB,,, | Performed by: STUDENT IN AN ORGANIZED HEALTH CARE EDUCATION/TRAINING PROGRAM

## 2024-12-31 PROCEDURE — G2211 COMPLEX E/M VISIT ADD ON: HCPCS | Mod: S$GLB,,, | Performed by: STUDENT IN AN ORGANIZED HEALTH CARE EDUCATION/TRAINING PROGRAM

## 2024-12-31 PROCEDURE — 99999 PR PBB SHADOW E&M-EST. PATIENT-LVL III: CPT | Mod: PBBFAC,,, | Performed by: STUDENT IN AN ORGANIZED HEALTH CARE EDUCATION/TRAINING PROGRAM

## 2024-12-31 PROCEDURE — 92020 GONIOSCOPY: CPT | Mod: S$GLB,,, | Performed by: STUDENT IN AN ORGANIZED HEALTH CARE EDUCATION/TRAINING PROGRAM

## 2024-12-31 PROCEDURE — 99214 OFFICE O/P EST MOD 30 MIN: CPT | Mod: 24,S$GLB,, | Performed by: STUDENT IN AN ORGANIZED HEALTH CARE EDUCATION/TRAINING PROGRAM

## 2024-12-31 PROCEDURE — 76514 ECHO EXAM OF EYE THICKNESS: CPT | Mod: S$GLB,,, | Performed by: STUDENT IN AN ORGANIZED HEALTH CARE EDUCATION/TRAINING PROGRAM
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
## 2024-12-31 NOTE — PATIENT INSTRUCTIONS
GLAUCOMA DROP INSTRUCTIONS  Please use your eyedrops as follows:  - The color refers to the bottle top.  - Some generic medications come in bottle with white tops. If you have any questions about your drops, please ask your doctor.    Drug Eye Top Color Breakfast Lunch Dinner Bedtime   Alphagan:  Brimonidine Left Purple X X X    Cosopt:  Dorzolamide/  Timolol Left Blue X  X    Rocklatan Left White    X   Pred Forte:  prednisolone Left Pink or   white X  X    Diamox  (acetazolamide) (pill)  1000 mg  1000 mg

## 2025-01-07 ENCOUNTER — CLINICAL SUPPORT (OUTPATIENT)
Dept: OPHTHALMOLOGY | Facility: CLINIC | Age: 45
End: 2025-01-07

## 2025-01-07 ENCOUNTER — OFFICE VISIT (OUTPATIENT)
Dept: OPHTHALMOLOGY | Facility: CLINIC | Age: 45
End: 2025-01-07

## 2025-01-07 ENCOUNTER — OFFICE VISIT (OUTPATIENT)
Dept: OPHTHALMOLOGY | Facility: CLINIC | Age: 45
End: 2025-01-07
Payer: OTHER MISCELLANEOUS

## 2025-01-07 DIAGNOSIS — H40.52X4 SECONDARY GLAUCOMA, LEFT, INDETERMINATE STAGE: ICD-10-CM

## 2025-01-07 DIAGNOSIS — H35.62 RETINAL HEMORRHAGE, LEFT: ICD-10-CM

## 2025-01-07 DIAGNOSIS — H40.52X4 SECONDARY GLAUCOMA, LEFT, INDETERMINATE STAGE: Primary | ICD-10-CM

## 2025-01-07 DIAGNOSIS — S05.92XD OCULAR TRAUMA, LEFT EYE, SUBSEQUENT ENCOUNTER: ICD-10-CM

## 2025-01-07 DIAGNOSIS — S05.92XD OCULAR TRAUMA, LEFT EYE, SUBSEQUENT ENCOUNTER: Primary | ICD-10-CM

## 2025-01-07 DIAGNOSIS — H43.12 VITREOUS HEMORRHAGE, LEFT: ICD-10-CM

## 2025-01-07 DIAGNOSIS — S05.8X2D COMMOTIO RETINAE OF LEFT EYE, SUBSEQUENT ENCOUNTER: ICD-10-CM

## 2025-01-07 PROCEDURE — 99999 PR PBB SHADOW E&M-EST. PATIENT-LVL III: CPT | Mod: PBBFAC,,, | Performed by: STUDENT IN AN ORGANIZED HEALTH CARE EDUCATION/TRAINING PROGRAM

## 2025-01-07 PROCEDURE — 99213 OFFICE O/P EST LOW 20 MIN: CPT | Mod: PBBFAC | Performed by: OPHTHALMOLOGY

## 2025-01-07 PROCEDURE — 92202 OPSCPY EXTND ON/MAC DRAW: CPT | Mod: 59,PBBFAC | Performed by: OPHTHALMOLOGY

## 2025-01-07 PROCEDURE — 99999 PR PBB SHADOW E&M-EST. PATIENT-LVL III: CPT | Mod: PBBFAC,,, | Performed by: OPHTHALMOLOGY

## 2025-01-07 PROCEDURE — 92134 CPTRZ OPH DX IMG PST SGM RTA: CPT | Mod: PBBFAC | Performed by: OPHTHALMOLOGY

## 2025-01-07 RX ORDER — PREDNISOLONE ACETATE 10 MG/ML
1 SUSPENSION/ DROPS OPHTHALMIC 4 TIMES DAILY
Qty: 5 ML | Refills: 3 | Status: SHIPPED | OUTPATIENT
Start: 2025-01-07 | End: 2026-01-07
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
## 2025-01-28 ENCOUNTER — CLINICAL SUPPORT (OUTPATIENT)
Dept: OPHTHALMOLOGY | Facility: CLINIC | Age: 45
End: 2025-01-28

## 2025-01-28 ENCOUNTER — OFFICE VISIT (OUTPATIENT)
Dept: OPHTHALMOLOGY | Facility: CLINIC | Age: 45
End: 2025-01-28
Payer: OTHER MISCELLANEOUS

## 2025-01-28 DIAGNOSIS — S05.8X2D COMMOTIO RETINAE OF LEFT EYE, SUBSEQUENT ENCOUNTER: ICD-10-CM

## 2025-01-28 DIAGNOSIS — H40.52X4 SECONDARY GLAUCOMA, LEFT, INDETERMINATE STAGE: ICD-10-CM

## 2025-01-28 DIAGNOSIS — H35.62 RETINAL HEMORRHAGE, LEFT: ICD-10-CM

## 2025-01-28 DIAGNOSIS — H43.12 VITREOUS HEMORRHAGE, LEFT: ICD-10-CM

## 2025-01-28 DIAGNOSIS — S05.92XD OCULAR TRAUMA, LEFT EYE, SUBSEQUENT ENCOUNTER: Primary | ICD-10-CM

## 2025-01-28 PROCEDURE — 92134 CPTRZ OPH DX IMG PST SGM RTA: CPT | Mod: PBBFAC | Performed by: OPHTHALMOLOGY

## 2025-01-28 PROCEDURE — 99213 OFFICE O/P EST LOW 20 MIN: CPT | Mod: PBBFAC,25 | Performed by: OPHTHALMOLOGY

## 2025-01-28 PROCEDURE — 92202 OPSCPY EXTND ON/MAC DRAW: CPT | Mod: 59,PBBFAC | Performed by: OPHTHALMOLOGY

## 2025-01-28 PROCEDURE — 99999 PR PBB SHADOW E&M-EST. PATIENT-LVL III: CPT | Mod: PBBFAC,,, | Performed by: OPHTHALMOLOGY

## 2025-01-28 RX ORDER — BRIMONIDINE TARTRATE 2 MG/ML
1 SOLUTION/ DROPS OPHTHALMIC 3 TIMES DAILY
Qty: 5 ML | Refills: 6 | Status: SHIPPED | OUTPATIENT
Start: 2025-01-28 | End: 2026-01-28

## 2025-01-28 RX ORDER — DORZOLAMIDE HYDROCHLORIDE AND TIMOLOL MALEATE 20; 5 MG/ML; MG/ML
1 SOLUTION/ DROPS OPHTHALMIC 3 TIMES DAILY
Qty: 10 ML | Refills: 3 | Status: SHIPPED | OUTPATIENT
Start: 2025-01-28 | End: 2026-01-28
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
## 2025-02-05 ENCOUNTER — OFFICE VISIT (OUTPATIENT)
Dept: OPHTHALMOLOGY | Facility: CLINIC | Age: 45
End: 2025-02-05
Payer: OTHER MISCELLANEOUS

## 2025-02-05 DIAGNOSIS — H40.52X4 SECONDARY GLAUCOMA, LEFT, INDETERMINATE STAGE: Primary | ICD-10-CM

## 2025-02-05 DIAGNOSIS — H40.043 STEROID RESPONDER, BILATERAL: ICD-10-CM

## 2025-02-05 PROCEDURE — 99999 PR PBB SHADOW E&M-EST. PATIENT-LVL III: CPT | Mod: PBBFAC,,, | Performed by: STUDENT IN AN ORGANIZED HEALTH CARE EDUCATION/TRAINING PROGRAM

## 2025-02-05 PROCEDURE — 99213 OFFICE O/P EST LOW 20 MIN: CPT | Mod: PBBFAC | Performed by: STUDENT IN AN ORGANIZED HEALTH CARE EDUCATION/TRAINING PROGRAM

## 2025-02-05 PROCEDURE — 99214 OFFICE O/P EST MOD 30 MIN: CPT | Mod: 24,S$GLB,, | Performed by: STUDENT IN AN ORGANIZED HEALTH CARE EDUCATION/TRAINING PROGRAM

## 2025-02-05 PROCEDURE — G2211 COMPLEX E/M VISIT ADD ON: HCPCS | Mod: S$GLB,,, | Performed by: STUDENT IN AN ORGANIZED HEALTH CARE EDUCATION/TRAINING PROGRAM

## 2025-02-05 RX ORDER — ACETAZOLAMIDE 500 MG/1
500 CAPSULE, EXTENDED RELEASE ORAL 2 TIMES DAILY
Qty: 60 CAPSULE | Refills: 0 | Status: SHIPPED | OUTPATIENT
Start: 2025-02-05 | End: 2025-03-07
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
## 2025-02-17 ENCOUNTER — PATIENT MESSAGE (OUTPATIENT)
Dept: OPHTHALMOLOGY | Facility: CLINIC | Age: 45
End: 2025-02-17
Payer: OTHER MISCELLANEOUS

## 2025-03-05 ENCOUNTER — OFFICE VISIT (OUTPATIENT)
Dept: OPHTHALMOLOGY | Facility: CLINIC | Age: 45
End: 2025-03-05
Payer: OTHER MISCELLANEOUS

## 2025-03-05 DIAGNOSIS — H40.52X4 SECONDARY GLAUCOMA, LEFT, INDETERMINATE STAGE: Primary | ICD-10-CM

## 2025-03-05 NOTE — LETTER
2025    Harman Osuna  [id number] [address] [place] [address]             [facility] - [address] [place] [address]  [address]  [place] [address]  Phone: [phone]  Fax: [phone] To whom it may concern,     Mr. Harman Osuna (: 84) will require eye pressure lowering drops indefinitely due to his traumatic glaucoma of the left eye. If you have any questions or concerns please reach out to our office number.      Sincerely,   Giselle Blnak MD
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
## 2025-03-14 ENCOUNTER — PATIENT MESSAGE (OUTPATIENT)
Dept: OPHTHALMOLOGY | Facility: CLINIC | Age: 45
End: 2025-03-14

## 2025-03-18 ENCOUNTER — OFFICE VISIT (OUTPATIENT)
Dept: OPHTHALMOLOGY | Facility: CLINIC | Age: 45
End: 2025-03-18

## 2025-03-18 DIAGNOSIS — H40.52X4 SECONDARY GLAUCOMA, LEFT, INDETERMINATE STAGE: Primary | ICD-10-CM

## 2025-03-18 DIAGNOSIS — H40.043 STEROID RESPONDER, BILATERAL: ICD-10-CM

## 2025-03-18 PROCEDURE — 99999 PR PBB SHADOW E&M-EST. PATIENT-LVL III: CPT | Mod: PBBFAC,,, | Performed by: STUDENT IN AN ORGANIZED HEALTH CARE EDUCATION/TRAINING PROGRAM

## 2025-03-18 PROCEDURE — G2211 COMPLEX E/M VISIT ADD ON: HCPCS | Mod: S$PBB,,, | Performed by: STUDENT IN AN ORGANIZED HEALTH CARE EDUCATION/TRAINING PROGRAM

## 2025-03-18 PROCEDURE — 99213 OFFICE O/P EST LOW 20 MIN: CPT | Mod: PBBFAC | Performed by: STUDENT IN AN ORGANIZED HEALTH CARE EDUCATION/TRAINING PROGRAM

## 2025-03-18 PROCEDURE — 99214 OFFICE O/P EST MOD 30 MIN: CPT | Mod: S$PBB,,, | Performed by: STUDENT IN AN ORGANIZED HEALTH CARE EDUCATION/TRAINING PROGRAM
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
## 2025-03-21 ENCOUNTER — OFFICE VISIT (OUTPATIENT)
Dept: OPHTHALMOLOGY | Facility: CLINIC | Age: 45
End: 2025-03-21

## 2025-03-21 DIAGNOSIS — H43.12 VITREOUS HEMORRHAGE, LEFT: ICD-10-CM

## 2025-03-21 DIAGNOSIS — H40.52X4 SECONDARY GLAUCOMA, LEFT, INDETERMINATE STAGE: ICD-10-CM

## 2025-03-21 DIAGNOSIS — H35.62 RETINAL HEMORRHAGE, LEFT: Primary | ICD-10-CM

## 2025-03-21 DIAGNOSIS — H40.043 STEROID RESPONDER, BILATERAL: ICD-10-CM

## 2025-03-21 DIAGNOSIS — S05.92XD OCULAR TRAUMA, LEFT EYE, SUBSEQUENT ENCOUNTER: ICD-10-CM

## 2025-03-21 DIAGNOSIS — S05.8X2D COMMOTIO RETINAE OF LEFT EYE, SUBSEQUENT ENCOUNTER: ICD-10-CM

## 2025-03-21 PROCEDURE — 99999 PR PBB SHADOW E&M-EST. PATIENT-LVL III: CPT | Mod: PBBFAC,,, | Performed by: OPHTHALMOLOGY

## 2025-03-21 PROCEDURE — 99213 OFFICE O/P EST LOW 20 MIN: CPT | Mod: PBBFAC | Performed by: OPHTHALMOLOGY

## 2025-03-21 NOTE — PROGRESS NOTES
Plan of Care Note:    Spoke to patient after Dr. Herring's visit to make final surgical plan OS.    Did not use drops or Diamox this morning. IOP 42 Jennifer by me OS.    R/B/A discussed for Ahmed implant vs cyclophotocoagulation. Poor overall visual prognosis due to retinal damage. Patient and family elects CPC OS.    Plan:  Restart Cosopt 2/2, Brim 3/3,  mg PO BID  Schedule CPC OS    OR TBD -- CPC OS  Retrobulbar Block  Propofol push, 3-5 minute case    Giselle Blank MD
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
## 2025-03-26 ENCOUNTER — TELEPHONE (OUTPATIENT)
Dept: OPHTHALMOLOGY | Facility: CLINIC | Age: 45
End: 2025-03-26
Payer: OTHER MISCELLANEOUS

## 2025-03-26 DIAGNOSIS — H40.52X4 SECONDARY GLAUCOMA, LEFT, INDETERMINATE STAGE: Primary | ICD-10-CM

## 2025-03-27 ENCOUNTER — TELEPHONE (OUTPATIENT)
Dept: OPHTHALMOLOGY | Facility: CLINIC | Age: 45
End: 2025-03-27
Payer: OTHER MISCELLANEOUS

## 2025-04-10 ENCOUNTER — TELEPHONE (OUTPATIENT)
Dept: OPHTHALMOLOGY | Facility: CLINIC | Age: 45
End: 2025-04-10
Payer: COMMERCIAL

## 2025-04-11 NOTE — PRE-PROCEDURE INSTRUCTIONS

## 2025-04-14 ENCOUNTER — NURSE TRIAGE (OUTPATIENT)
Dept: ADMINISTRATIVE | Facility: CLINIC | Age: 45
End: 2025-04-14
Payer: COMMERCIAL

## 2025-04-14 NOTE — TELEPHONE ENCOUNTER
Reason for Disposition   Question about upcoming scheduled test, no triage required and triager able to answer question    Protocols used: Information Only Call-A-AH  Pt significant order called stated no one called with surgery time. Triage nurse called surgery department and spoke with [place] who stated pt needed to be there 2 hours before. Cg informed they needed to be there for 5am. Cg verbalized understanding.

## 2025-04-15 ENCOUNTER — ANESTHESIA (OUTPATIENT)
Dept: SURGERY | Facility: HOSPITAL | Age: 45
End: 2025-04-15
Payer: COMMERCIAL

## 2025-04-15 ENCOUNTER — ANESTHESIA EVENT (OUTPATIENT)
Dept: SURGERY | Facility: HOSPITAL | Age: 45
End: 2025-04-15
Payer: COMMERCIAL

## 2025-04-15 ENCOUNTER — HOSPITAL ENCOUNTER (OUTPATIENT)
Facility: HOSPITAL | Age: 45
Discharge: HOME | End: 2025-04-15
Attending: STUDENT IN AN ORGANIZED HEALTH CARE EDUCATION/TRAINING PROGRAM | Admitting: STUDENT IN AN ORGANIZED HEALTH CARE EDUCATION/TRAINING PROGRAM
Payer: COMMERCIAL

## 2025-04-15 VITALS
HEIGHT: 69 IN | TEMPERATURE: 98 F | DIASTOLIC BLOOD PRESSURE: 92 MMHG | HEART RATE: 47 BPM | SYSTOLIC BLOOD PRESSURE: 149 MMHG | WEIGHT: 190 LBS | RESPIRATION RATE: 20 BRPM | BODY MASS INDEX: 28.14 KG/M2 | OXYGEN SATURATION: 100 %

## 2025-04-15 DIAGNOSIS — H40.52X4 SECONDARY GLAUCOMA, LEFT, INDETERMINATE STAGE: Primary | ICD-10-CM

## 2025-04-15 DIAGNOSIS — S05.92XD OCULAR TRAUMA, LEFT EYE, SUBSEQUENT ENCOUNTER: ICD-10-CM

## 2025-04-15 DIAGNOSIS — H40.9 GLAUCOMA, LEFT EYE: ICD-10-CM

## 2025-04-15 PROCEDURE — 25000003 PHARM REV CODE 250

## 2025-04-15 PROCEDURE — 66710 CILIARY TRANSSLERAL THERAPY: CPT | Mod: LT,,, | Performed by: STUDENT IN AN ORGANIZED HEALTH CARE EDUCATION/TRAINING PROGRAM

## 2025-04-15 PROCEDURE — 27201423 OPTIME MED/SURG SUP & DEVICES STERILE SUPPLY: Performed by: STUDENT IN AN ORGANIZED HEALTH CARE EDUCATION/TRAINING PROGRAM

## 2025-04-15 PROCEDURE — D9220A PRA ANESTHESIA: Mod: CRNA,,,

## 2025-04-15 PROCEDURE — 71000044 HC DOSC ROUTINE RECOVERY FIRST HOUR: Performed by: STUDENT IN AN ORGANIZED HEALTH CARE EDUCATION/TRAINING PROGRAM

## 2025-04-15 PROCEDURE — 37000008 HC ANESTHESIA 1ST 15 MINUTES: Performed by: STUDENT IN AN ORGANIZED HEALTH CARE EDUCATION/TRAINING PROGRAM

## 2025-04-15 PROCEDURE — 63600175 PHARM REV CODE 636 W HCPCS

## 2025-04-15 PROCEDURE — 25000003 PHARM REV CODE 250: Performed by: STUDENT IN AN ORGANIZED HEALTH CARE EDUCATION/TRAINING PROGRAM

## 2025-04-15 PROCEDURE — D9220A PRA ANESTHESIA: Mod: ANES,,, | Performed by: STUDENT IN AN ORGANIZED HEALTH CARE EDUCATION/TRAINING PROGRAM

## 2025-04-15 PROCEDURE — 36000706: Performed by: STUDENT IN AN ORGANIZED HEALTH CARE EDUCATION/TRAINING PROGRAM

## 2025-04-15 PROCEDURE — 36000707: Performed by: STUDENT IN AN ORGANIZED HEALTH CARE EDUCATION/TRAINING PROGRAM

## 2025-04-15 PROCEDURE — 71000015 HC POSTOP RECOV 1ST HR: Performed by: STUDENT IN AN ORGANIZED HEALTH CARE EDUCATION/TRAINING PROGRAM

## 2025-04-15 PROCEDURE — 37000009 HC ANESTHESIA EA ADD 15 MINS: Performed by: STUDENT IN AN ORGANIZED HEALTH CARE EDUCATION/TRAINING PROGRAM

## 2025-04-15 RX ORDER — SODIUM CHLORIDE 0.9 % (FLUSH) 0.9 %
10 SYRINGE (ML) INJECTION
Status: DISCONTINUED | OUTPATIENT
Start: 2025-04-15 | End: 2025-04-15 | Stop reason: HOSPADM

## 2025-04-15 RX ORDER — PROPOFOL 10 MG/ML
VIAL (ML) INTRAVENOUS
Status: DISCONTINUED | OUTPATIENT
Start: 2025-04-15 | End: 2025-04-15

## 2025-04-15 RX ORDER — MOXIFLOXACIN 5 MG/ML
1 SOLUTION/ DROPS OPHTHALMIC
Status: DISCONTINUED | OUTPATIENT
Start: 2025-04-15 | End: 2025-04-15 | Stop reason: HOSPADM

## 2025-04-15 RX ORDER — NEOMYCIN SULFATE, POLYMYXIN B SULFATE, AND DEXAMETHASONE 3.5; 10000; 1 MG/G; [USP'U]/G; MG/G
OINTMENT OPHTHALMIC
Status: DISCONTINUED
Start: 2025-04-15 | End: 2025-04-15 | Stop reason: HOSPADM

## 2025-04-15 RX ORDER — MOXIFLOXACIN 5 MG/ML
SOLUTION/ DROPS OPHTHALMIC
Status: DISCONTINUED
Start: 2025-04-15 | End: 2025-04-15 | Stop reason: WASHOUT

## 2025-04-15 RX ORDER — LIDOCAINE HYDROCHLORIDE 10 MG/ML
INJECTION, SOLUTION EPIDURAL; INFILTRATION; INTRACAUDAL; PERINEURAL
Status: DISCONTINUED
Start: 2025-04-15 | End: 2025-04-15 | Stop reason: WASHOUT

## 2025-04-15 RX ORDER — ONDANSETRON HYDROCHLORIDE 2 MG/ML
INJECTION, SOLUTION INTRAVENOUS
Status: DISCONTINUED | OUTPATIENT
Start: 2025-04-15 | End: 2025-04-15

## 2025-04-15 RX ORDER — PREDNISOLONE ACETATE 10 MG/ML
1 SUSPENSION/ DROPS OPHTHALMIC
Qty: 5 ML | Refills: 3 | Status: SHIPPED | OUTPATIENT
Start: 2025-04-15 | End: 2026-04-15

## 2025-04-15 RX ORDER — MIDAZOLAM HYDROCHLORIDE 1 MG/ML
INJECTION INTRAMUSCULAR; INTRAVENOUS
Status: DISCONTINUED | OUTPATIENT
Start: 2025-04-15 | End: 2025-04-15

## 2025-04-15 RX ORDER — PREDNISOLONE ACETATE 10 MG/ML
1 SUSPENSION/ DROPS OPHTHALMIC
Status: DISCONTINUED | OUTPATIENT
Start: 2025-04-15 | End: 2025-04-15 | Stop reason: HOSPADM

## 2025-04-15 RX ORDER — GLUCAGON 1 MG
1 KIT INJECTION
Status: DISCONTINUED | OUTPATIENT
Start: 2025-04-15 | End: 2025-04-15 | Stop reason: HOSPADM

## 2025-04-15 RX ORDER — DEXAMETHASONE SODIUM PHOSPHATE 4 MG/ML
INJECTION, SOLUTION INTRA-ARTICULAR; INTRALESIONAL; INTRAMUSCULAR; INTRAVENOUS; SOFT TISSUE
Status: DISCONTINUED
Start: 2025-04-15 | End: 2025-04-15 | Stop reason: HOSPADM

## 2025-04-15 RX ORDER — ACETAMINOPHEN 325 MG/1
650 TABLET ORAL 2 TIMES DAILY PRN
Status: DISCONTINUED | OUTPATIENT
Start: 2025-04-15 | End: 2025-04-15 | Stop reason: HOSPADM

## 2025-04-15 RX ORDER — PROPARACAINE HYDROCHLORIDE 5 MG/ML
1 SOLUTION/ DROPS OPHTHALMIC
Status: DISCONTINUED | OUTPATIENT
Start: 2025-04-15 | End: 2025-04-15 | Stop reason: HOSPADM

## 2025-04-15 RX ORDER — CEFAZOLIN SODIUM 500 MG/2.2ML
INJECTION, POWDER, FOR SOLUTION INTRAMUSCULAR; INTRAVENOUS
Status: DISCONTINUED
Start: 2025-04-15 | End: 2025-04-15 | Stop reason: HOSPADM

## 2025-04-15 RX ORDER — HALOPERIDOL LACTATE 5 MG/ML
0.5 INJECTION, SOLUTION INTRAMUSCULAR EVERY 10 MIN PRN
Status: DISCONTINUED | OUTPATIENT
Start: 2025-04-15 | End: 2025-04-15 | Stop reason: HOSPADM

## 2025-04-15 RX ORDER — FENTANYL CITRATE 50 UG/ML
25 INJECTION, SOLUTION INTRAMUSCULAR; INTRAVENOUS EVERY 5 MIN PRN
Refills: 0 | Status: DISCONTINUED | OUTPATIENT
Start: 2025-04-15 | End: 2025-04-15 | Stop reason: HOSPADM

## 2025-04-15 RX ORDER — FENTANYL CITRATE 50 UG/ML
INJECTION, SOLUTION INTRAMUSCULAR; INTRAVENOUS
Status: DISCONTINUED | OUTPATIENT
Start: 2025-04-15 | End: 2025-04-15

## 2025-04-15 RX ORDER — PREDNISOLONE ACETATE 10 MG/ML
SUSPENSION/ DROPS OPHTHALMIC
Status: DISCONTINUED
Start: 2025-04-15 | End: 2025-04-15 | Stop reason: WASHOUT

## 2025-04-15 RX ORDER — LIDOCAINE HYDROCHLORIDE 40 MG/ML
INJECTION, SOLUTION RETROBULBAR
Status: DISCONTINUED
Start: 2025-04-15 | End: 2025-04-15 | Stop reason: HOSPADM

## 2025-04-15 RX ORDER — ATROPINE SULFATE 10 MG/ML
SOLUTION/ DROPS OPHTHALMIC
Status: DISCONTINUED
Start: 2025-04-15 | End: 2025-04-15 | Stop reason: HOSPADM

## 2025-04-15 RX ADMIN — PREDNISOLONE ACETATE 1 DROP: 10 SUSPENSION/ DROPS OPHTHALMIC at 05:04

## 2025-04-15 RX ADMIN — MOXIFLOXACIN OPHTHALMIC 1 DROP: 5 SOLUTION/ DROPS OPHTHALMIC at 05:04

## 2025-04-15 RX ADMIN — ONDANSETRON 4 MG: 2 INJECTION INTRAMUSCULAR; INTRAVENOUS at 07:04

## 2025-04-15 RX ADMIN — PROPARACAINE HYDROCHLORIDE 1 DROP: 5 SOLUTION/ DROPS OPHTHALMIC at 05:04

## 2025-04-15 RX ADMIN — PROPOFOL 20 MG: 10 INJECTION, EMULSION INTRAVENOUS at 07:04

## 2025-04-15 RX ADMIN — SODIUM CHLORIDE: 0.9 INJECTION, SOLUTION INTRAVENOUS at 07:04

## 2025-04-15 RX ADMIN — PROPOFOL 10 MG: 10 INJECTION, EMULSION INTRAVENOUS at 07:04

## 2025-04-15 RX ADMIN — FENTANYL CITRATE 25 MCG: 50 INJECTION, SOLUTION INTRAMUSCULAR; INTRAVENOUS at 07:04

## 2025-04-15 RX ADMIN — PROPOFOL 50 MG: 10 INJECTION, EMULSION INTRAVENOUS at 07:04

## 2025-04-15 RX ADMIN — MIDAZOLAM HYDROCHLORIDE 2 MG: 2 INJECTION, SOLUTION INTRAMUSCULAR; INTRAVENOUS at 07:04

## 2025-04-15 NOTE — OP NOTE
DATE OF SURGERY: 4/15/2025    PREOPERATIVE DIAGNOSIS:  Secondary glaucoma severe stage, left eye    POSTOPERATIVE DIAGNOSIS:  Same    PROCEDURE PERFORMED:  Transscleral diode cyclophotocoagulation, left eye.    SURGEON:  Giselle Blank MD    ANESTHESIA:  Monitored anesthesia care    COMPLICATIONS:  None.    ESTIMATED BLOOD LOSS:   None    SPECIMENS:   None    INDICATIONS FOR PROCEDURE:  Patient has a history of uncontrolled eye pressure, left eye. Given low vision potential and high intraocular pressure with maximum tolerated topical medications, transscleral diode cyclophotocoagulation on the left eye was recommended. After discussed risks, benefits and alternatives, the patient elects to proceed with procedure.    DESCRIPTION OF PROCEDURE:   After informed consent was obtained, the patient was brought to the operating room and placed in the supine position. Time out was performed to confirm patient identity, procedure and correct operative eye.  The patient's cardiopulmonary status was continuously monitored by anesthesia staff.  A wire lid speculum was placed. A total of 20 diode laser applications for 360 degrees were given at the limbus, with laser settings of 4000 milliseconds and 1250 milliwatts. Subconjuctival injection of 0.25 mL of decadron (10mg/mL) was given inferiorly. The lid speculum was removed. The eye was dressed with a  patch. The patient tolerated the procedure well and was returned to the recovery area in good condition.

## 2025-04-15 NOTE — DISCHARGE INSTRUCTIONS
POST OP INSTRUCTIONS:    PILLS:  Diamox (500 mg)  Take 2 pills (1000 mg) twice daily      NEW POST SURGERY DROPS:  In the LEFT eye:  Prednisolone Acetate (PINK or WHITE top)  SHAKE then place 1 drop 6x daily    Atropine (RED top)  Place 1 drop 2 times daily    GLAUCOMA DROPS:  Drug Eye Top Color Breakfast Lunch Dinner Bedtime   Alphagan:  Brimonidine LEFT Purple X X X    Cosopt:  Dorzolamide/  Timolol LEFT Blue X  X      WAIT 5 MINUTES BETWEEN DROPS. THE ORDER OF THE DROPS DOES NOT MATTER.    If you experience any increasing redness, sensitivity to light, pain, or changes in vision, please call the office immediately.    Giselle Blank MD  Office number: [phone]

## 2025-04-15 NOTE — ANESTHESIA PREPROCEDURE EVALUATION
"[facility]  Anesthesia Pre-Operative Evaluation         Patient Name: Harman Osuna  YOB: 1980  MRN: [id number]    SUBJECTIVE:     Pre-operative evaluation for Procedure(s) (LRB):  DESTRUCTION, CILIARY BODY, USING LASER (Left)     04/15/2025    Harman Osuna is a 44 y.o. male w/ a significant PMHx of .    Patient now presents for the above procedure(s).      LDA: None documented.       Peripheral IV - Single Lumen 04/15/25 0535 20 G Anterior;Right Forearm (Active)   Site Assessment Clean;Dry;Intact 04/15/25 0550   Line Securement Device Secured with sutureless device 04/15/25 0550   Extremity Assessment Distal to IV No abnormal discoloration;No redness;No swelling 04/15/25 0550   Line Status Blood return noted;Infusing 04/15/25 0550   Dressing Status Clean;Dry;Intact 04/15/25 0550   Dressing Intervention Integrity maintained 04/15/25 0550   Dressing Change Due 04/19/25 04/15/25 0550   Site Change Due 04/19/25 04/15/25 0550   Reason Not Rotated Anticipated discharge 04/15/25 0550   Number of days: 0       Prev airway: None documented.    Drips: None documented.      Problem List[1]    Review of patient's allergies indicates:  No Known Allergies    Current Inpatient Medications:      Medications Ordered Prior to Encounter[2]    No past surgical history on file.    Social History[3]    OBJECTIVE:     Vital Signs Range (Last 24H):  Temp:  [36.4 °C (97.5 °F)]   Pulse:  [66-72]   Resp:  [18]   BP: (141)/(86)   SpO2:  [100 %]       Significant Labs:  No results found for: "WBC", "HGB", "HCT", "PLT", "CHOL", "TRIG", "HDL", "LDLDIRECT", "ALT", "AST", "NA", "K", "CL", "CREATININE", "BUN", "CO2", "TSH", "PSA", "INR", "GLUF", "HGBA1C", "MICROALBUR"    Diagnostic Studies: No relevant studies.    EKG:   No results found for this or any previous visit.    2D ECHO:  TTE:  No results found for this or any previous visit.    ALEJANDRO:  No results found for this or any previous " visit.    ASSESSMENT/PLAN:           Pre-op Assessment    I have reviewed the Patient Summary Reports.    I have reviewed the NPO Status.   I have reviewed the Medications.     Review of Systems  Anesthesia Hx:   Neg history of prior surgery.          Denies Family Hx of Anesthesia complications.    Denies Personal Hx of Anesthesia complications.                    Hematology/Oncology:  Hematology Normal   Oncology Normal                                   EENT/Dental:  EENT/Dental Normal           Cardiovascular:  Cardiovascular Normal                                              Pulmonary:  Pulmonary Normal                       Renal/:  Renal/ Normal                 Hepatic/GI:  Hepatic/GI Normal                    Musculoskeletal:  Musculoskeletal Normal                Neurological:  Neurology Normal                                      Endocrine:  Endocrine Normal            Dermatological:  Skin Normal    Psych:  Psychiatric Normal                    Physical Exam  General: Well nourished, Oriented, Alert and Cooperative    Airway:  Mallampati: III / II  Mouth Opening: Normal  TM Distance: Normal  Tongue: Normal  Neck ROM: Normal ROM    Dental:  Intact        Anesthesia Plan  Type of Anesthesia, risks & benefits discussed:    Anesthesia Type: Gen Natural Airway  Intra-op Monitoring Plan: Standard ASA Monitors  Induction:  IV  Informed Consent: Patient consented to blood products? No  ASA Score: 2  Day of Surgery Review of History & Physical: H&P Update referred to the surgeon/provider.    Ready For Surgery From Anesthesia Perspective.     .           [1]   Patient Active Problem List  Diagnosis    Retinal hemorrhage, left    Ocular trauma, left eye, initial encounter    Commotio retinae of left eye    Vitreous hemorrhage, left    Ocular trauma, left eye, subsequent encounter    Secondary glaucoma, left, indeterminate stage    Steroid responder, bilateral   [2]   No current facility-administered medications  on file prior to encounter.     Current Outpatient Medications on File Prior to Encounter   Medication Sig Dispense Refill    brimonidine 0.2% (ALPHAGAN) 0.2 % Drop Place 1 drop into both eyes 3 (three) times daily. 5 mL 6    cyclopentolate 1% (CYCLOGYL) 1 % ophthalmic solution Place 2 drops into the right eye every 6 (six) hours as needed. 15 mL 0    dorzolamide-timolol 2-0.5% (COSOPT) 22.3-6.8 mg/mL ophthalmic solution Place 1 drop into both eyes 3 (three) times daily. 10 mL 3    hydrOXYzine HCl (ATARAX) 25 MG tablet Take 1 tablet (25 mg total) by mouth every 6 (six) hours. 20 tablet 0    oxycodone-acetaminophen (PERCOCET) 5-325 mg per tablet Take 1 tablet by mouth every 8 (eight) hours as needed for Pain (As needed for severe 10 out of 10 pain). 6 tablet 0    acetaZOLAMIDE (DIAMOX) 500 mg CpSR Take 1 capsule (500 mg total) by mouth 2 (two) times daily. 60 capsule 0    diphenhydrAMINE (SOMINEX) 25 mg tablet Take 25 mg by mouth nightly as needed for Insomnia.      ibuprofen (ADVIL,MOTRIN) 800 MG tablet Take 1 tablet (800 mg total) by mouth every 6 (six) hours as needed for Pain (Take with food as needed for pain). 20 tablet 0    polymyxin B sulf-trimethoprim (POLYTRIM) 10,000 unit- 1 mg/mL Drop Place 1 drop into the right eye every 2 (two) hours. 10 mL 3    prednisoLONE acetate (PRED FORTE) 1 % DrpS Place 1 drop into the left eye 4 (four) times daily. (Patient not taking: Reported on 2/5/2025) 5 mL 3    triamcinolone acetonide 0.1% (KENALOG) 0.1 % Lotn Apply topically 2 (two) times daily. 60 mL 0   [3]   Social History  Socioeconomic History    Marital status: Single   Tobacco Use    Smoking status: Every Day     Current packs/day: 0.25     Types: Cigarettes     Passive exposure: Never    Smokeless tobacco: Never   Substance and Sexual Activity    Alcohol use: No    Drug use: No     Social Drivers of Health     Financial Resource Strain: Low Risk  (3/4/2025)    Overall Financial Resource Strain ([facility])      Difficulty of Paying Living Expenses: Not very hard   Food Insecurity: Patient Declined (3/4/2025)    Hunger Vital Sign     Worried About Running Out of Food in the Last Year: Patient declined     Ran Out of Food in the Last Year: Patient declined   Transportation Needs: No Transportation Needs (3/4/2025)    PRAPARE - Transportation     Lack of Transportation (Medical): No     Lack of Transportation (Non-Medical): No   Physical Activity: Inactive (3/4/2025)    Exercise Vital Sign     Days of Exercise per Week: 0 days     Minutes of Exercise per Session: 0 min   Stress: No Stress Concern Present (3/4/2025)    [employer] [place] [employer] - Occupational Stress Questionnaire     Feeling of Stress : Not at all   Housing Stability: Low Risk  (3/4/2025)    Housing Stability Vital Sign     Unable to Pay for Housing in the Last Year: No     Homeless in the Last Year: No

## 2025-04-15 NOTE — DISCHARGE SUMMARY
[facility] (1st Fl)  Discharge Note  Short Stay    Procedure(s) (LRB):  DESTRUCTION, CILIARY BODY, USING LASER (Left)    OUTCOME: Patient tolerated treatment/procedure well without complication and is now ready for discharge.    DISPOSITION: Home or Self Care    FINAL DIAGNOSIS:  Secondary glaucoma, left, indeterminate stage    FOLLOWUP: In clinic    DISCHARGE INSTRUCTIONS:  No discharge procedures on file.     TIME SPENT ON DISCHARGE: 5 minutes

## 2025-04-15 NOTE — TRANSFER OF CARE
"Anesthesia Transfer of Care Note    Patient: Harman Osuna    Procedure(s) Performed: Procedure(s) (LRB):  DESTRUCTION, CILIARY BODY, USING LASER (Left)    Patient location: Other: [place]    Anesthesia Type: MAC    Transport from OR: Transported from OR on room air with adequate spontaneous ventilation    Post pain: adequate analgesia    Post assessment: no apparent anesthetic complications and tolerated procedure well    Post vital signs: stable    Level of consciousness: sedated    Nausea/Vomiting: no nausea/vomiting    Complications: none    Transfer of care protocol was followed      Last vitals: Visit Vitals  /89 (BP Location: Left arm, Patient Position: Lying)   Pulse 70   Temp 36.5 °C (97.7 °F) (Skin)   Resp 20   Ht 5' 9" (1.753 m)   Wt 86.2 kg (190 lb)   SpO2 99%   BMI 28.06 kg/m²     "

## 2025-04-21 NOTE — ANESTHESIA POSTPROCEDURE EVALUATION
Anesthesia Post Evaluation    Patient: Harman Osuna    Procedure(s) Performed: Procedure(s) (LRB):  DESTRUCTION, CILIARY BODY, USING LASER (Left)    Final Anesthesia Type: general      Patient location during evaluation: PACU  Patient participation: Yes- Able to Participate  Level of consciousness: awake  Post-procedure vital signs: reviewed and stable  Pain management: adequate  Airway patency: patent    PONV status at discharge: No PONV  Anesthetic complications: no      Cardiovascular status: blood pressure returned to baseline  Respiratory status: unassisted  Hydration status: euvolemic  Follow-up not needed.              Vitals Value Taken Time   /92 04/15/25 08:02   Temp 36.5 °C (97.7 °F) 04/15/25 07:30   Pulse 55 04/15/25 08:28   Resp 11 04/15/25 08:13   SpO2 100 % 04/15/25 08:28   Vitals shown include unfiled device data.      No case tracking events are documented in the log.      Pain/Kaylan Score: No data recorded

## 2025-04-23 ENCOUNTER — OFFICE VISIT (OUTPATIENT)
Dept: OPHTHALMOLOGY | Facility: CLINIC | Age: 45
End: 2025-04-23

## 2025-04-23 DIAGNOSIS — H40.52X4 SECONDARY GLAUCOMA, LEFT, INDETERMINATE STAGE: Primary | ICD-10-CM

## 2025-04-23 PROCEDURE — 99024 POSTOP FOLLOW-UP VISIT: CPT | Mod: ,,, | Performed by: STUDENT IN AN ORGANIZED HEALTH CARE EDUCATION/TRAINING PROGRAM

## 2025-04-23 PROCEDURE — 99999 PR PBB SHADOW E&M-EST. PATIENT-LVL III: CPT | Mod: PBBFAC,,, | Performed by: STUDENT IN AN ORGANIZED HEALTH CARE EDUCATION/TRAINING PROGRAM

## 2025-04-23 PROCEDURE — 99213 OFFICE O/P EST LOW 20 MIN: CPT | Mod: PBBFAC | Performed by: STUDENT IN AN ORGANIZED HEALTH CARE EDUCATION/TRAINING PROGRAM
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
## 2025-04-23 NOTE — PATIENT INSTRUCTIONS
POST OP DROPS:    STOP DIAMOX    In the LEFT eye:  Prednisolone Acetate (PINK or WHITE top)  SHAKE then place 1 drop 4x daily    Atropine (RED top)  Place 1 drop 2 times daily    GLAUCOMA DROPS:  Drug Eye Top Color Breakfast Lunch Dinner Bedtime   Alphagan:  Brimonidine LEFT Purple X X X    Cosopt:  Dorzolamide/  Timolol LEFT Blue X  X      WAIT 5 MINUTES BETWEEN DROPS. THE ORDER OF THE DROPS DOES NOT MATTER.    If you experience any increasing redness, sensitivity to light, pain, or changes in vision, please call the office immediately.    Giselle Blank MD  Office number: [phone]

## 2025-05-14 ENCOUNTER — OFFICE VISIT (OUTPATIENT)
Dept: OPHTHALMOLOGY | Facility: CLINIC | Age: 45
End: 2025-05-14
Payer: COMMERCIAL

## 2025-05-14 DIAGNOSIS — H40.52X4 SECONDARY GLAUCOMA, LEFT, INDETERMINATE STAGE: Primary | ICD-10-CM

## 2025-05-14 DIAGNOSIS — H40.043 STEROID RESPONDER, BILATERAL: ICD-10-CM

## 2025-05-14 PROCEDURE — 99999 PR PBB SHADOW E&M-EST. PATIENT-LVL II: CPT | Mod: PBBFAC,,, | Performed by: STUDENT IN AN ORGANIZED HEALTH CARE EDUCATION/TRAINING PROGRAM

## 2025-05-14 PROCEDURE — 99024 POSTOP FOLLOW-UP VISIT: CPT | Mod: S$GLB,,, | Performed by: STUDENT IN AN ORGANIZED HEALTH CARE EDUCATION/TRAINING PROGRAM
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
## 2025-05-14 NOTE — PATIENT INSTRUCTIONS
POST OP DROPS:    In the LEFT eye:  Prednisolone Acetate (PINK or WHITE top)  SHAKE then place 1 drop 2x daily    GLAUCOMA DROPS:  Drug Eye Top Color Breakfast Lunch Dinner Bedtime   Cosopt:  Dorzolamide/  Timolol LEFT Blue X  X      WAIT 5 MINUTES BETWEEN DROPS. THE ORDER OF THE DROPS DOES NOT MATTER.    If you experience any increasing redness, sensitivity to light, pain, or changes in vision, please call the office immediately.    Giselle Blank MD  Office number: [phone]    ---------------    GOTAS POST OPERATORIAS:    En el antonio LARRY:  Acetato de prednisolona (tapa ROSADA o RENATO)  AGITAR y aplicar 1 gota 2 veces al día    GOTAS PARA GLAUCOMA:  Medicamento Antonio Color de la tapa Desayuno  Almuerzo  Keith  Acostarse   Cosopt:  Dorzolamide/  Timolol LARRY  Soraya  X  X      ESPERE 5 MINUTOS ENTRE GOTAS. EL ORDEN DE LAS GOTAS NO IMPORTA.    Si experimenta un aumento del enrojecimiento, sensibilidad a la viridiana, dolor o cambios en la visión, llame a la oficina de inmediato.    Dra. Giselle Blank  Número de oficina: [phone]

## 2025-05-29 ENCOUNTER — TELEPHONE (OUTPATIENT)
Dept: OPHTHALMOLOGY | Facility: CLINIC | Age: 45
End: 2025-05-29
Payer: COMMERCIAL

## 2025-05-29 NOTE — TELEPHONE ENCOUNTER
Spoke with patient's wife. She states patient's job requires a letter from Dr. Blank with information about future surgeries, medications, and visual potential. I explained that Dr. Blank is on maternity leave and she is unable to write a letter at this time. Message will be sent to supervisor.

## 2025-05-29 NOTE — TELEPHONE ENCOUNTER
----- Message from Jing sent at 5/29/2025 11:49 AM CDT -----  Type:  Needs Medical AdviceWho Called: Adama Wife Symptoms (please be specific):  How long has patient had these symptoms:  Pharmacy name and phone #:  Would the patient rather a call back or a response via [facility]? Callcodetag Call Back Number: [phone] Additional Information:

## 2025-06-19 ENCOUNTER — PATIENT MESSAGE (OUTPATIENT)
Dept: OPHTHALMOLOGY | Facility: CLINIC | Age: 45
End: 2025-06-19
Payer: COMMERCIAL

## 2025-06-26 ENCOUNTER — CLINICAL SUPPORT (OUTPATIENT)
Dept: OPHTHALMOLOGY | Facility: CLINIC | Age: 45
End: 2025-06-26
Payer: COMMERCIAL

## 2025-06-26 ENCOUNTER — OFFICE VISIT (OUTPATIENT)
Dept: OPHTHALMOLOGY | Facility: CLINIC | Age: 45
End: 2025-06-26
Payer: COMMERCIAL

## 2025-06-26 DIAGNOSIS — H43.12 VITREOUS HEMORRHAGE, LEFT: ICD-10-CM

## 2025-06-26 DIAGNOSIS — H40.52X4 SECONDARY GLAUCOMA, LEFT, INDETERMINATE STAGE: ICD-10-CM

## 2025-06-26 DIAGNOSIS — S05.8X2D COMMOTIO RETINAE OF LEFT EYE, SUBSEQUENT ENCOUNTER: ICD-10-CM

## 2025-06-26 DIAGNOSIS — S05.92XD OCULAR TRAUMA, LEFT EYE, SUBSEQUENT ENCOUNTER: ICD-10-CM

## 2025-06-26 DIAGNOSIS — H35.62 RETINAL HEMORRHAGE, LEFT: Primary | ICD-10-CM
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
## 2025-06-30 ENCOUNTER — CLINICAL SUPPORT (OUTPATIENT)
Dept: OPHTHALMOLOGY | Facility: CLINIC | Age: 45
End: 2025-06-30
Payer: COMMERCIAL

## 2025-06-30 NOTE — PROGRESS NOTES
hvf ou done/ou/rel/fix/coop.good/patient stated no vision in os/patient chart checked for allergies/ou plano/ej.        Assessment /Plan     For exam results, see Encounter Report.    There are no diagnoses linked to this encounter.

## 2025-07-02 ENCOUNTER — PATIENT MESSAGE (OUTPATIENT)
Dept: OPHTHALMOLOGY | Facility: CLINIC | Age: 45
End: 2025-07-02
Payer: COMMERCIAL

## 2025-07-08 ENCOUNTER — PATIENT MESSAGE (OUTPATIENT)
Dept: OPHTHALMOLOGY | Facility: CLINIC | Age: 45
End: 2025-07-08
Payer: COMMERCIAL

## 2025-07-15 ENCOUNTER — TELEPHONE (OUTPATIENT)
Dept: OPHTHALMOLOGY | Facility: CLINIC | Age: 45
End: 2025-07-15
Payer: COMMERCIAL

## 2025-08-14 ENCOUNTER — PATIENT MESSAGE (OUTPATIENT)
Dept: OPHTHALMOLOGY | Facility: CLINIC | Age: 45
End: 2025-08-14
Payer: COMMERCIAL

## 2025-08-15 ENCOUNTER — PATIENT MESSAGE (OUTPATIENT)
Dept: OPHTHALMOLOGY | Facility: CLINIC | Age: 45
End: 2025-08-15
Payer: COMMERCIAL

## 2025-09-03 RX ORDER — DORZOLAMIDE HYDROCHLORIDE AND TIMOLOL MALEATE 20; 5 MG/ML; MG/ML
1 SOLUTION/ DROPS OPHTHALMIC 3 TIMES DAILY
Qty: 10 ML | Refills: 3 | Status: SHIPPED | OUTPATIENT
Start: 2025-09-03 | End: 2026-09-03

## (undated) DEVICE — TOWEL OR DISP STRL BLUE 4/PK

## (undated) DEVICE — PROBE MICROPULSE P3

## (undated) DEVICE — CANNULA ANTERIOR 20G

## (undated) DEVICE — PROBE LASER G ILLUMINATE

## (undated) DEVICE — SOL BETADINE 5%

## (undated) DEVICE — APPLICATOR STRL COT 2INNR 6IN

## (undated) DEVICE — SPONGE COTTON TRAY 4X4IN

## (undated) DEVICE — DRAPE THREE-QTR REINF 53X77IN

## (undated) DEVICE — SOL BSS BALANCED SALT